# Patient Record
Sex: FEMALE | Race: WHITE | NOT HISPANIC OR LATINO | Employment: FULL TIME | URBAN - METROPOLITAN AREA
[De-identification: names, ages, dates, MRNs, and addresses within clinical notes are randomized per-mention and may not be internally consistent; named-entity substitution may affect disease eponyms.]

---

## 2017-01-19 ENCOUNTER — APPOINTMENT (OUTPATIENT)
Dept: LAB | Facility: CLINIC | Age: 22
End: 2017-01-19
Payer: COMMERCIAL

## 2017-01-19 ENCOUNTER — ALLSCRIPTS OFFICE VISIT (OUTPATIENT)
Dept: OTHER | Facility: OTHER | Age: 22
End: 2017-01-19

## 2017-01-19 DIAGNOSIS — Z00.00 ENCOUNTER FOR GENERAL ADULT MEDICAL EXAMINATION WITHOUT ABNORMAL FINDINGS: ICD-10-CM

## 2017-01-19 DIAGNOSIS — L81.8 OTHER SPECIFIED DISORDERS OF PIGMENTATION: ICD-10-CM

## 2017-01-19 LAB
ALBUMIN SERPL BCP-MCNC: 3.9 G/DL (ref 3.5–5)
ALP SERPL-CCNC: 40 U/L (ref 46–116)
ALT SERPL W P-5'-P-CCNC: 25 U/L (ref 12–78)
ANION GAP SERPL CALCULATED.3IONS-SCNC: 8 MMOL/L (ref 4–13)
AST SERPL W P-5'-P-CCNC: 8 U/L (ref 5–45)
BILIRUB SERPL-MCNC: 0.3 MG/DL (ref 0.2–1)
BUN SERPL-MCNC: 11 MG/DL (ref 5–25)
CALCIUM SERPL-MCNC: 8.6 MG/DL (ref 8.3–10.1)
CHLORIDE SERPL-SCNC: 106 MMOL/L (ref 100–108)
CO2 SERPL-SCNC: 28 MMOL/L (ref 21–32)
CREAT SERPL-MCNC: 0.62 MG/DL (ref 0.6–1.3)
GFR SERPL CREATININE-BSD FRML MDRD: >60 ML/MIN/1.73SQ M
GLUCOSE SERPL-MCNC: 78 MG/DL (ref 65–140)
POTASSIUM SERPL-SCNC: 3.7 MMOL/L (ref 3.5–5.3)
PROT SERPL-MCNC: 7.6 G/DL (ref 6.4–8.2)
SODIUM SERPL-SCNC: 142 MMOL/L (ref 136–145)

## 2017-01-19 PROCEDURE — 86705 HEP B CORE ANTIBODY IGM: CPT

## 2017-01-19 PROCEDURE — 80053 COMPREHEN METABOLIC PANEL: CPT

## 2017-01-19 PROCEDURE — 36415 COLL VENOUS BLD VENIPUNCTURE: CPT

## 2017-01-19 PROCEDURE — 87340 HEPATITIS B SURFACE AG IA: CPT

## 2017-01-19 PROCEDURE — 86803 HEPATITIS C AB TEST: CPT

## 2017-01-19 PROCEDURE — 86704 HEP B CORE ANTIBODY TOTAL: CPT

## 2017-01-20 LAB
HBV CORE AB SER QL: NORMAL
HBV CORE IGM SER QL: NORMAL
HBV SURFACE AG SER QL: NORMAL
HCV AB SER QL: NORMAL

## 2017-07-07 ENCOUNTER — HOSPITAL ENCOUNTER (EMERGENCY)
Facility: HOSPITAL | Age: 22
Discharge: HOME | End: 2017-07-07
Attending: EMERGENCY MEDICINE | Admitting: EMERGENCY MEDICINE
Payer: COMMERCIAL

## 2017-07-07 VITALS
SYSTOLIC BLOOD PRESSURE: 135 MMHG | HEART RATE: 90 BPM | RESPIRATION RATE: 16 BRPM | OXYGEN SATURATION: 99 % | WEIGHT: 147 LBS | TEMPERATURE: 98.2 F | DIASTOLIC BLOOD PRESSURE: 69 MMHG

## 2017-07-07 DIAGNOSIS — H61.22 IMPACTED CERUMEN, LEFT EAR: Primary | ICD-10-CM

## 2017-07-07 PROCEDURE — 99283 EMERGENCY DEPT VISIT LOW MDM: CPT

## 2017-07-16 ENCOUNTER — HOSPITAL ENCOUNTER (EMERGENCY)
Facility: HOSPITAL | Age: 22
Discharge: HOME | End: 2017-07-16
Attending: EMERGENCY MEDICINE | Admitting: EMERGENCY MEDICINE
Payer: COMMERCIAL

## 2017-07-16 VITALS
WEIGHT: 147 LBS | TEMPERATURE: 97.2 F | OXYGEN SATURATION: 98 % | HEART RATE: 72 BPM | HEIGHT: 61 IN | BODY MASS INDEX: 27.75 KG/M2 | DIASTOLIC BLOOD PRESSURE: 89 MMHG | SYSTOLIC BLOOD PRESSURE: 126 MMHG | RESPIRATION RATE: 20 BRPM

## 2017-07-16 DIAGNOSIS — Z04.1 ENCOUNTER FOR EXAMINATION FOLLOWING MOTOR VEHICLE COLLISION (MVC): ICD-10-CM

## 2017-07-16 DIAGNOSIS — M54.9 UPPER BACK PAIN: Primary | ICD-10-CM

## 2017-07-16 DIAGNOSIS — R51.9 HEADACHE: ICD-10-CM

## 2017-07-16 PROCEDURE — 99284 EMERGENCY DEPT VISIT MOD MDM: CPT

## 2017-07-16 RX ORDER — NAPROXEN 500 MG/1
500 TABLET ORAL ONCE
Status: COMPLETED | OUTPATIENT
Start: 2017-07-16 | End: 2017-07-16

## 2017-07-16 RX ORDER — ACETAMINOPHEN 325 MG/1
975 TABLET ORAL ONCE
Status: COMPLETED | OUTPATIENT
Start: 2017-07-16 | End: 2017-07-16

## 2017-07-16 RX ADMIN — ACETAMINOPHEN 975 MG: 325 TABLET, FILM COATED ORAL at 21:51

## 2017-07-16 RX ADMIN — NAPROXEN 500 MG: 500 TABLET ORAL at 21:52

## 2017-08-08 ENCOUNTER — APPOINTMENT (EMERGENCY)
Dept: CT IMAGING | Facility: HOSPITAL | Age: 22
End: 2017-08-08
Payer: COMMERCIAL

## 2017-08-08 ENCOUNTER — HOSPITAL ENCOUNTER (EMERGENCY)
Facility: HOSPITAL | Age: 22
Discharge: HOME | End: 2017-08-08
Attending: EMERGENCY MEDICINE
Payer: COMMERCIAL

## 2017-08-08 VITALS
OXYGEN SATURATION: 99 % | DIASTOLIC BLOOD PRESSURE: 76 MMHG | SYSTOLIC BLOOD PRESSURE: 134 MMHG | RESPIRATION RATE: 16 BRPM | HEART RATE: 80 BPM | BODY MASS INDEX: 27.78 KG/M2 | WEIGHT: 147 LBS | TEMPERATURE: 98.4 F

## 2017-08-08 DIAGNOSIS — R51.9 HEADACHE: Primary | ICD-10-CM

## 2017-08-08 DIAGNOSIS — S09.90XA HEAD INJURY, INITIAL ENCOUNTER: ICD-10-CM

## 2017-08-08 DIAGNOSIS — S06.0X9A CONCUSSION: ICD-10-CM

## 2017-08-08 LAB — HCG UR QL: NEGATIVE

## 2017-08-08 PROCEDURE — 70450 CT HEAD/BRAIN W/O DYE: CPT

## 2017-08-08 PROCEDURE — 81025 URINE PREGNANCY TEST: CPT | Performed by: EMERGENCY MEDICINE

## 2017-08-08 PROCEDURE — 99284 EMERGENCY DEPT VISIT MOD MDM: CPT

## 2017-08-08 RX ORDER — ONDANSETRON 4 MG/1
4 TABLET, ORALLY DISINTEGRATING ORAL ONCE
Status: COMPLETED | OUTPATIENT
Start: 2017-08-08 | End: 2017-08-08

## 2017-08-08 RX ORDER — ONDANSETRON 4 MG/1
4 TABLET, ORALLY DISINTEGRATING ORAL EVERY 8 HOURS PRN
Qty: 20 TABLET | Refills: 0 | Status: SHIPPED | OUTPATIENT
Start: 2017-08-08 | End: 2018-04-11

## 2017-08-08 RX ADMIN — ONDANSETRON 4 MG: 4 TABLET, ORALLY DISINTEGRATING ORAL at 01:22

## 2018-01-03 ENCOUNTER — ALLSCRIPTS OFFICE VISIT (OUTPATIENT)
Dept: OTHER | Facility: OTHER | Age: 23
End: 2018-01-03

## 2018-01-03 DIAGNOSIS — R93.89 ABNORMAL FINDINGS ON DIAGNOSTIC IMAGING OF OTHER SPECIFIED BODY STRUCTURES: ICD-10-CM

## 2018-01-03 DIAGNOSIS — L98.9 DISORDER OF SKIN OR SUBCUTANEOUS TISSUE: ICD-10-CM

## 2018-01-03 DIAGNOSIS — Z01.812 ENCOUNTER FOR PREPROCEDURAL LABORATORY EXAMINATION: ICD-10-CM

## 2018-01-04 ENCOUNTER — HOSPITAL ENCOUNTER (OUTPATIENT)
Dept: ULTRASOUND IMAGING | Facility: HOSPITAL | Age: 23
Discharge: HOME | End: 2018-01-04
Payer: COMMERCIAL

## 2018-01-04 DIAGNOSIS — L98.9 DISORDER OF SKIN OR SUBCUTANEOUS TISSUE: ICD-10-CM

## 2018-01-04 PROCEDURE — 76882 US LMTD JT/FCL EVL NVASC XTR: CPT

## 2018-01-04 NOTE — PROGRESS NOTES
Assessment   1  Thumb lesion (709 9) (L98 9)   2  Bilateral impacted cerumen (380 4) (O54 23)    Plan   Thumb lesion    · US EXTREMITY SOFT TISSUE; Status:Hold For - Scheduling; Requested [id number]; Discussion/Summary   Discussion Summary:    Right some swelling/lesion advised to get ultrasound of the right thumb, and to return to clinic in 1 week  Depending upon findings she may be referred to the surgical team if excision is warranted  cerumen bilateral ears advised to use Debrox 3 drops t i d  and return to clinic in 1 week for irrigation  is bisexual and has not had sex with a male partner in 2 years  However she is advised to make an appointment with gyn for regular female check up  Has 1 daughter age 3years old  Counseling Documentation With Imm: The patient was counseled regarding instructions for management,-- risk factor reductions,-- risks and benefits of treatment options,-- importance of compliance with treatment  Patient Education: Educational resources provided:    Medication SE Review and Pt Understands Tx: Possible side effects of new medications were reviewed with the patient/guardian today  The treatment plan was reviewed with the patient/guardian  The patient/guardian understands and agrees with the treatment plan      Chief Complaint   Chief Complaint Free Text Note Form: Patient presents to the clinic with swelling on the right thumb      History of Present Illness   HPI: As above reports she went bowling almost a year ago and felt like the bowling ball pinched a nerve and she had a swelling on the right thumb which never went away  She denies pain  The swelling has not been increasing in size but is uncomfortable  She denies any significant decrease in  strength  She was seen in January 2017 with the diagnosis of impacted cerumen  Advised to use Debrox and return for irrigation but she did not  Today her ears are still occluded with impacted cerumen   She is advised to use the Debrox and return to clinic in 1 week for irrigation  She reports she has had intermittent problems with hearing since her last visit  Hospital Based Practices Required Assessment:      Pain Assessment      the patient states they do not have pain  (on a scale of 0 to 10, the patient rates the pain at 0 )       Prefered Language is  [place]  Primary Language is  English  Education Completed: disease/condition,-- medications-- and-- treatment/procedure      Teaching Method: verbal      Person Taught: patient      Evaluation Of Learning: verbalized/demonstrated understanding      Review of Systems   Complete-Female:      Constitutional: No fever, no chills, feels well, no tiredness, no recent weight gain or weight loss  Eyes: as noted in HPI,-- no eye pain,-- no eyesight problems,-- eyes not red-- and-- no purulent discharge from the eyes  ENT: hearing loss, but-- as noted in HPI,-- no earache,-- no nosebleeds-- and-- no nasal discharge  Respiratory: No complaints of shortness of breath, no wheezing, no cough, no SOB on exertion, no orthopnea, no PND  Gastrointestinal: No complaints of abdominal pain, no constipation, no nausea or vomiting, no diarrhea, no bloody stools  Integumentary: skin lesion-- and-- Swelling lump on the right thumb, but-- as noted in HPI,-- no rashes,-- no itching-- and-- no skin wound  ROS Reviewed:    ROS reviewed  Active Problems   1  Bilateral impacted cerumen (380 4) (H61 23)   2  Need for immunization against influenza (V04 81) (Z23)   3  Need for Tdap vaccination (V06 1) (Z23)   4  Tattoos (709 09) (L81 8)    Past Medical History   1  History of  (957 90)   2  History of Bacterial vaginosis (616 10,041 9) (N76 0,B96 89)   3  History of Birth control (V25 9) (Z30 9)   4  History of abnormal menstrual cycle (V13 29) (Z87 42)   5  History of bipolar disorder (V11 1) (Z86 59)   6   History of Screening for STD (sexually transmitted disease) (V74 5) (Z11 3)   7  History of Vaginal candidiasis (112 1) (B37 3)  Active Problems And Past Medical History Reviewed: The active problems and past medical history were reviewed and updated today  Surgical History   1  History of  Section  Surgical History Reviewed: The surgical history was reviewed and updated today  Family History   Mother    1  Family history of hypertension (V17 49) (Z82 49)  Father    2  Family history unknown (V49 89) (Z78 9)  Family History    3  Denied: Family history of Drug abuse   4  No family history of mental disorder  Family History Reviewed: The family history was reviewed and updated today  Social History    · Being A Social Drinker   · Bisexual contact   · Current some day smoker (305 1) (F17 200)   · Denied: History of Drug Use   · Exercises, 4x week   · Occasional caffeine consumption   · One child  Social History Reviewed: The social history was reviewed and updated today  The social history was reviewed and is unchanged  Current Meds    1  Debrox 6 5 % Otic Solution; INSTILL 4 DROP 3 times daily; Therapy: [id number] to (Last Rx:2017)  Requested for: [id number] Ordered  Medication List Reviewed: The medication list was reviewed and updated today  Allergies   1  No Known Drug Allergies  2  No Known Environmental Allergies   3  No Known Food Allergies    Vitals   Vital Signs    Recorded: 71SGW8380 02:39PM   Temperature 97 9 F   Heart Rate 70   Systolic 98   Diastolic 62   Height 5 ft    Weight 142 lb 13 71 oz   BMI Calculated 27 9   BSA Calculated 1 62     Physical Exam        Constitutional      General appearance: No acute distress, well appearing and well nourished  well developed,-- normal body odor,-- does not smell of feces,-- does not smell of urine,-- well nourished,-- clothing appropriate-- and-- well groomed        Ears, Nose, Mouth, and Throat      External inspection of ears and nose: Normal  Otoscopic examination: Abnormal  -- Impacted cerumen bilateral ears, right worse than left  Oropharynx: Normal with no erythema, edema, exudate or lesions  Pulmonary      Respiratory effort: No increased work of breathing or signs of respiratory distress  Auscultation of lungs: Clear to auscultation  Cardiovascular      Auscultation of heart: Normal rate and rhythm, normal S1 and S2, without murmurs  Musculoskeletal      Digits and nails: Abnormal  -- There is a mass on the 1st metacarpal bone of the thumb  Slightly hard, movable  No skin abnormality in this area  Nontender to palpation  No decrease in strength of the thumb  Inspection/palpation of joints, bones, and muscles: Normal        Psychiatric      Orientation to person, place, and time: Normal        Mood and affect: Normal           Attending Note   Collaborating Physician Note: Collaborating Physician: I discussed the case with the Advanced Practitioner and reviewed the note,-- I supervised the Advanced Practitioner-- and-- I agree with the Advanced Practitioner note        Future Appointments      Date/Time Provider Specialty Site   01/10/2018 03:20 PM Mago Dubon 6 PCP     Signatures    Electronically signed by : Celeste Moralez [address] [place] [address]; Doug  3 2018  3:17PM EST                       (Author)     Electronically signed by : LAMBERT Ryan ; Doug  3 2018  3:41PM EST                       (Author)

## 2018-01-06 ENCOUNTER — TRANSCRIBE ORDERS (OUTPATIENT)
Dept: ADMINISTRATIVE | Facility: HOSPITAL | Age: 23
End: 2018-01-06

## 2018-01-06 DIAGNOSIS — L98.9 FIBROHISTIOCYTIC PROLIFERATION OF THE SKIN: ICD-10-CM

## 2018-01-06 DIAGNOSIS — R93.89 ABNORMAL RADIOLOGICAL FINDINGS IN SKIN AND SUBCUTANEOUS TISSUE: Primary | ICD-10-CM

## 2018-01-11 ENCOUNTER — GENERIC CONVERSION - ENCOUNTER (OUTPATIENT)
Dept: OTHER | Facility: OTHER | Age: 23
End: 2018-01-11

## 2018-01-11 ENCOUNTER — HOSPITAL ENCOUNTER (OUTPATIENT)
Dept: MRI IMAGING | Facility: HOSPITAL | Age: 23
Discharge: HOME | End: 2018-01-11
Payer: COMMERCIAL

## 2018-01-11 DIAGNOSIS — L98.9 FIBROHISTIOCYTIC PROLIFERATION OF THE SKIN: ICD-10-CM

## 2018-01-11 DIAGNOSIS — R93.89 ABNORMAL RADIOLOGICAL FINDINGS IN SKIN AND SUBCUTANEOUS TISSUE: ICD-10-CM

## 2018-01-11 PROCEDURE — 73220 MRI UPPR EXTREMITY W/O&W/DYE: CPT

## 2018-01-11 PROCEDURE — A9585 GADOBUTROL INJECTION: HCPCS | Performed by: NURSE PRACTITIONER

## 2018-01-11 RX ADMIN — GADOBUTROL 6 ML: 604.72 INJECTION INTRAVENOUS at 23:08

## 2018-01-12 VITALS
HEIGHT: 60 IN | WEIGHT: 147.71 LBS | BODY MASS INDEX: 29 KG/M2 | HEART RATE: 76 BPM | DIASTOLIC BLOOD PRESSURE: 68 MMHG | TEMPERATURE: 98.7 F | SYSTOLIC BLOOD PRESSURE: 114 MMHG

## 2018-01-15 ENCOUNTER — GENERIC CONVERSION - ENCOUNTER (OUTPATIENT)
Dept: OTHER | Facility: OTHER | Age: 23
End: 2018-01-15

## 2018-01-22 VITALS
BODY MASS INDEX: 28.05 KG/M2 | DIASTOLIC BLOOD PRESSURE: 62 MMHG | SYSTOLIC BLOOD PRESSURE: 98 MMHG | WEIGHT: 142.86 LBS | TEMPERATURE: 97.9 F | HEART RATE: 70 BPM | HEIGHT: 60 IN

## 2018-01-24 VITALS
WEIGHT: 143.3 LBS | HEIGHT: 60 IN | SYSTOLIC BLOOD PRESSURE: 98 MMHG | BODY MASS INDEX: 28.13 KG/M2 | TEMPERATURE: 98.7 F | HEART RATE: 72 BPM | DIASTOLIC BLOOD PRESSURE: 64 MMHG

## 2018-03-16 ENCOUNTER — OFFICE VISIT (OUTPATIENT)
Dept: OBGYN CLINIC | Facility: HOSPITAL | Age: 23
End: 2018-03-16
Payer: COMMERCIAL

## 2018-03-16 VITALS
DIASTOLIC BLOOD PRESSURE: 73 MMHG | WEIGHT: 134.2 LBS | BODY MASS INDEX: 26.35 KG/M2 | SYSTOLIC BLOOD PRESSURE: 112 MMHG | HEIGHT: 60 IN | HEART RATE: 83 BPM

## 2018-03-16 DIAGNOSIS — R22.31 MASS OF FINGER OF RIGHT HAND: Primary | ICD-10-CM

## 2018-03-16 PROCEDURE — 99203 OFFICE O/P NEW LOW 30 MIN: CPT | Performed by: ORTHOPAEDIC SURGERY

## 2018-03-16 NOTE — H&P
ASSESSMENT/PLAN:    Diagnoses and all orders for this visit:    Mass of finger of right hand  -     Case request operating room: EXCISION BIOPSY TISSUE LESION/MASS UPPER EXTREMITY right thumb; Standing  -     Case request operating room: EXCISION BIOPSY TISSUE LESION/MASS UPPER EXTREMITY right thumb    Other orders  -     Diet NPO; Sips with meds; Standing  -     Height and weight upon arrival; Standing  -     Void on call to OR; Standing  -     Insert peripheral IV; Standing  -     ceFAZolin (ANCEF) IVPB (premix) 2,000 mg; Infuse 2,000 mg into a venous catheter once         Assessment:   Mass  right  Thumb     Plan: Mass excision  right Ulnar  Middle phalanx of R thumb    Follow Up: After Surgery    To Do Next Visit:       General Discussions:       Operative Discussions:     Standard Consent: The risks and benefits of the procedure were explained to the patient, which include, but are not limited to: Bleeding, infection, recurrence, pain, scar, damage to tendons, damage to nerves, and damage to blood vessels, failure to give desired results and complications related to anesthesia  These risks, along with alternative conservative treatment options, and postoperative protocols were voiced back and understood by the patient  All questions were answered to the patient's satisfaction  The patient agrees to comply with a standard postoperative protocol, and is willing to proceed  Education was provided via written and auditory forms  There were no barriers to learning  Written handouts regarding wound care, incision and scar care, and general preoperative information was provided to the patient  Prior to surgery, the patient may be requested to stop all anti-inflammatory medications  Prophylactic aspirin, Plavix, and Coumadin may be allowed to be continued  Medications including vitamin E , ginkgo, and fish oil are requested to be stopped approximately one week prior to surgery    Hypertensive medications and beta blockers, if taken, should be continued  _____________________________________________________  CHIEF COMPLAINT:  Chief Complaint   Patient presents with    Right Thumb - Pain, Numbness         SUBJECTIVE:  Suzi Ortega is a 20y o  year old female who presents with discomfort and a mass to the right thumb  This started  1 year(s) ago as Sudden  Pt states that she does not recall any injury however she was bowling before this appeared and after she threw the ball she felt a pinch in her thumb  Pt states that the mass is growing in size over time  Radiation: Yes to the  thumb and occasionally  Previous Treatments: Patient got a US and MRI order by her PCP without relief  Associated symptoms: No Complaints    PAST MEDICAL HISTORY:  No past medical history on file  PAST SURGICAL HISTORY:  Past Surgical History:   Procedure Laterality Date     SECTION         FAMILY HISTORY:  No family history on file  SOCIAL HISTORY:  Social History   Substance Use Topics    Smoking status: Never Smoker    Smokeless tobacco: Not on file    Alcohol use Yes      Comment: occasionally       MEDICATIONS:    Current Outpatient Prescriptions:     ondansetron (ZOFRAN-ODT) 4 mg disintegrating tablet, Take 1 tablet by mouth every 8 (eight) hours as needed for nausea or vomiting for up to 7 days, Disp: 20 tablet, Rfl: 0    ALLERGIES:  No Known Allergies    REVIEW OF SYSTEMS:  Pertinent items are noted in HPI      LABS:  HgA1c: No results found for: HGBA1C  BMP:   Lab Results   Component Value Date    GLUCOSE 78 2017    CALCIUM 8 6 2017     2017    K 3 7 2017    CO2 28 2017     2017    BUN 11 2017    CREATININE 0 62 2017         _____________________________________________________  PHYSICAL EXAMINATION:  General: well developed and well nourished, alert, oriented times 3 and appears comfortable  Psychiatric: Normal  HEENT: Trachea Midline, No torticollis  Cardiovascular: No discernable arrhythmia  Pulmonary: No wheezing or stridor  Skin: Mass to the proximal phalanx of R thumb  Neurovascular: Sensation Intact to the Median, Ulnar, Radial Nerve, Motor Intact to the Median, Ulnar, Radial Nerve and Pulses Intact    MUSCULOSKELETAL EXAMINATION:  RIGHT SIDE:  Finger:  [id number] mobile mass to the ulnar side at mid axial line of proximal phalanx of R thumb     _____________________________________________________  STUDIES REVIEWED:  I have personally reviewed pertinent films in PACS and my interpretation is MRI shows a 1 8 x 1 5, 0 8 cm deep subcutaneous lesion medial to the 1st proximal phalanx         PROCEDURES PERFORMED:  Procedures  No Procedures performed today

## 2018-03-16 NOTE — PROGRESS NOTES
ASSESSMENT/PLAN:    Diagnoses and all orders for this visit:    Mass of finger of right hand  -     Case request operating room: EXCISION BIOPSY TISSUE LESION/MASS UPPER EXTREMITY right thumb; Standing  -     Case request operating room: EXCISION BIOPSY TISSUE LESION/MASS UPPER EXTREMITY right thumb    Other orders  -     Diet NPO; Sips with meds; Standing  -     Height and weight upon arrival; Standing  -     Void on call to OR; Standing  -     Insert peripheral IV; Standing  -     ceFAZolin (ANCEF) IVPB (premix) 2,000 mg; Infuse 2,000 mg into a venous catheter once         Assessment:   Mass  right  Thumb     Plan: Mass excision  right Ulnar  Middle phalanx of R thumb    Follow Up: After Surgery    To Do Next Visit:       General Discussions:       Operative Discussions:     Standard Consent: The risks and benefits of the procedure were explained to the patient, which include, but are not limited to: Bleeding, infection, recurrence, pain, scar, damage to tendons, damage to nerves, and damage to blood vessels, failure to give desired results and complications related to anesthesia  These risks, along with alternative conservative treatment options, and postoperative protocols were voiced back and understood by the patient  All questions were answered to the patient's satisfaction  The patient agrees to comply with a standard postoperative protocol, and is willing to proceed  Education was provided via written and auditory forms  There were no barriers to learning  Written handouts regarding wound care, incision and scar care, and general preoperative information was provided to the patient  Prior to surgery, the patient may be requested to stop all anti-inflammatory medications  Prophylactic aspirin, Plavix, and Coumadin may be allowed to be continued  Medications including vitamin E , ginkgo, and fish oil are requested to be stopped approximately one week prior to surgery    Hypertensive medications and beta blockers, if taken, should be continued  _____________________________________________________  CHIEF COMPLAINT:  Chief Complaint   Patient presents with    Right Thumb - Pain, Numbness         SUBJECTIVE:  Deanna Sanders is a 20y o  year old female who presents with discomfort and a mass to the right thumb  This started  1 year(s) ago as Sudden  Pt states that she does not recall any injury however she was bowling before this appeared and after she threw the ball she felt a pinch in her thumb  Pt states that the mass is growing in size over time  Radiation: Yes to the  thumb and occasionally  Previous Treatments: Patient got a US and MRI order by her PCP without relief  Associated symptoms: No Complaints    PAST MEDICAL HISTORY:  No past medical history on file  PAST SURGICAL HISTORY:  Past Surgical History:   Procedure Laterality Date     SECTION         FAMILY HISTORY:  No family history on file  SOCIAL HISTORY:  Social History   Substance Use Topics    Smoking status: Never Smoker    Smokeless tobacco: Not on file    Alcohol use Yes      Comment: occasionally       MEDICATIONS:    Current Outpatient Prescriptions:     ondansetron (ZOFRAN-ODT) 4 mg disintegrating tablet, Take 1 tablet by mouth every 8 (eight) hours as needed for nausea or vomiting for up to 7 days, Disp: 20 tablet, Rfl: 0    ALLERGIES:  No Known Allergies    REVIEW OF SYSTEMS:  Pertinent items are noted in HPI      LABS:  HgA1c: No results found for: HGBA1C  BMP:   Lab Results   Component Value Date    GLUCOSE 78 2017    CALCIUM 8 6 2017     2017    K 3 7 2017    CO2 28 2017     2017    BUN 11 2017    CREATININE 0 62 2017         _____________________________________________________  PHYSICAL EXAMINATION:  General: well developed and well nourished, alert, oriented times 3 and appears comfortable  Psychiatric: Normal  HEENT: Trachea Midline, No torticollis  Cardiovascular: No discernable arrhythmia  Pulmonary: No wheezing or stridor  Skin: Mass to the proximal phalanx of R thumb  Neurovascular: Sensation Intact to the Median, Ulnar, Radial Nerve, Motor Intact to the Median, Ulnar, Radial Nerve and Pulses Intact    MUSCULOSKELETAL EXAMINATION:  RIGHT SIDE:  Finger:  [id number] mobile mass to the ulnar side at mid axial line of proximal phalanx of R thumb     _____________________________________________________  STUDIES REVIEWED:  I have personally reviewed pertinent films in PACS and my interpretation is MRI shows a 1 8 x 1 5, 0 8 cm deep subcutaneous lesion medial to the 1st proximal phalanx         PROCEDURES PERFORMED:  Procedures  No Procedures performed today

## 2018-03-16 NOTE — LETTER
March 16, 2018     Patient: Zehra Mary   YOB: 1995   Date of Visit: 3/16/2018       To Whom it May Concern:    Janice Salgado is under my professional care  She was seen in my office on 3/16/2018  She may return to work on 3/19/18  If you have any questions or concerns, please don't hesitate to call           Sincerely,          Kati Webber MD        CC: No Recipients

## 2018-04-11 ENCOUNTER — OFFICE VISIT (OUTPATIENT)
Dept: OBGYN CLINIC | Facility: HOSPITAL | Age: 23
End: 2018-04-11
Payer: COMMERCIAL

## 2018-04-11 VITALS
BODY MASS INDEX: 25.91 KG/M2 | HEIGHT: 60 IN | HEART RATE: 78 BPM | SYSTOLIC BLOOD PRESSURE: 117 MMHG | WEIGHT: 132 LBS | DIASTOLIC BLOOD PRESSURE: 66 MMHG

## 2018-04-11 DIAGNOSIS — Z30.013 ENCOUNTER FOR INITIAL PRESCRIPTION OF INJECTABLE CONTRACEPTIVE: ICD-10-CM

## 2018-04-11 DIAGNOSIS — Z01.419 ENCOUNTER FOR GYNECOLOGICAL EXAMINATION WITH PAPANICOLAOU SMEAR OF CERVIX: Primary | ICD-10-CM

## 2018-04-11 DIAGNOSIS — Z11.3 SCREEN FOR STD (SEXUALLY TRANSMITTED DISEASE): ICD-10-CM

## 2018-04-11 PROCEDURE — 99395 PREV VISIT EST AGE 18-39: CPT | Performed by: OBSTETRICS & GYNECOLOGY

## 2018-04-11 PROCEDURE — G0124 SCREEN C/V THIN LAYER BY MD: HCPCS | Performed by: PATHOLOGY

## 2018-04-11 PROCEDURE — 3725F SCREEN DEPRESSION PERFORMED: CPT | Performed by: OBSTETRICS & GYNECOLOGY

## 2018-04-11 PROCEDURE — 87591 N.GONORRHOEAE DNA AMP PROB: CPT | Performed by: OBSTETRICS & GYNECOLOGY

## 2018-04-11 PROCEDURE — G0145 SCR C/V CYTO,THINLAYER,RESCR: HCPCS | Performed by: PATHOLOGY

## 2018-04-11 PROCEDURE — 87491 CHLMYD TRACH DNA AMP PROBE: CPT | Performed by: OBSTETRICS & GYNECOLOGY

## 2018-04-11 RX ORDER — MEDROXYPROGESTERONE ACETATE 150 MG/ML
150 INJECTION, SUSPENSION INTRAMUSCULAR
Qty: 1 ML | Refills: 6 | Status: SHIPPED | OUTPATIENT
Start: 2018-04-11 | End: 2019-07-17 | Stop reason: SDUPTHER

## 2018-04-11 NOTE — PROGRESS NOTES
Assessment/Plan:    No problem-specific Assessment & Plan notes found for this encounter  Diagnoses and all orders for this visit:    Encounter for gynecological examination with Papanicolaou smear of cervix  -     Liquid-based pap, screening    Screen for STD (sexually transmitted disease)  -     Chlamydia/GC amplified DNA by PCR    Encounter for initial prescription of injectable contraceptive  -     medroxyPROGESTERone (DEPO-PROVERA) 150 mg/mL injection; Inject 1 mL (150 mg total) into the shoulder, thigh, or buttocks every 3 (three) months      RTC for Depo injection at earliest convenience  Subjective:      Patient ID: Shirleyann Landau is a 20 y o   female  Here for routine annual exam   She has no complaints today  She is currently sexually active with one female partner  She would like to be tested for STI's and be started on Depo for contraception and improvement with HMB  Patient reports that her menses are regular, occurring every month and lasting for 4 days  She uses 5-6 pads/tampons per day and passes large clots with cramping  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  She reports smoking THC 3x/d and states that she plans to quit prior to starting school  She reports occasional alcohol use  She has a relatively healthy diet and does exercise  The following portions of the patient's history were reviewed and updated as appropriate:   She  has a past medical history of Migraine  She   Patient Active Problem List    Diagnosis Date Noted    Screen for STD (sexually transmitted disease) 2018    Encounter for gynecological examination with Papanicolaou smear of cervix 2018    Encounter for initial prescription of injectable contraceptive 2018    Mass of finger of right hand 2018     She  has a past surgical history that includes  section  Her family history includes Diabetes in her family    She reports that she has never smoked  She has never used smokeless tobacco  She reports that she drinks alcohol  She reports that she uses drugs, including Marijuana  Current Outpatient Prescriptions   Medication Sig Dispense Refill    medroxyPROGESTERone (DEPO-PROVERA) 150 mg/mL injection Inject 1 mL (150 mg total) into the shoulder, thigh, or buttocks every 3 (three) months 1 mL 6     No current facility-administered medications for this visit  Current Outpatient Prescriptions on File Prior to Visit   Medication Sig    [DISCONTINUED] ondansetron (ZOFRAN-ODT) 4 mg disintegrating tablet Take 1 tablet by mouth every 8 (eight) hours as needed for nausea or vomiting for up to 7 days     No current facility-administered medications on file prior to visit  She has No Known Allergies       Review of Systems   Constitutional: Negative  HENT: Negative  Eyes: Negative  Respiratory: Negative  Cardiovascular: Negative  Gastrointestinal: Negative  Endocrine: Negative  Genitourinary:        As per HPI   Musculoskeletal: Negative  Allergic/Immunologic: Negative  Neurological: Negative  Hematological: Negative  Psychiatric/Behavioral: Negative  Objective:      /66 (BP Location: Right arm, Patient Position: Sitting, Cuff Size: Standard)   Pulse 78   Ht 5' (1 524 m)   Wt 59 9 kg (132 lb)   LMP 03/24/2018 (Exact Date)   BMI 25 78 kg/m²          Physical Exam   Constitutional: She is oriented to person, place, and time  She appears well-developed and well-nourished  No distress  HENT:   Head: Normocephalic and atraumatic  Neck: Normal range of motion  Neck supple  No thyromegaly present  Cardiovascular: Normal rate, regular rhythm and normal heart sounds  Pulmonary/Chest: Effort normal and breath sounds normal  No respiratory distress  She has no wheezes  She exhibits no tenderness     Breast exam-no palpable masses, discharge, erythema or changes in skin Abdominal: Soft  She exhibits no distension  There is no tenderness  Genitourinary:   Genitourinary Comments: External genitalia- normal, no lesions  Vagina without lesions or discharge  Cervix no lesions or discharge  Uterus normal size, consistency and nontender  Adnexa nontender without palpable mass     Musculoskeletal: Normal range of motion  Neurological: She is alert and oriented to person, place, and time  Skin: Skin is warm and dry  Psychiatric: She has a normal mood and affect   Her behavior is normal

## 2018-04-11 NOTE — PATIENT INSTRUCTIONS
Please follow up for Depo at your earliest convenience  Will will contact you with the results of your Pap smear and other testing

## 2018-04-12 ENCOUNTER — CLINICAL SUPPORT (OUTPATIENT)
Dept: OBGYN CLINIC | Facility: HOSPITAL | Age: 23
End: 2018-04-12
Payer: COMMERCIAL

## 2018-04-12 DIAGNOSIS — Z30.013 ENCOUNTER FOR INITIAL PRESCRIPTION OF INJECTABLE CONTRACEPTIVE: Primary | ICD-10-CM

## 2018-04-12 LAB
CHLAMYDIA DNA CVX QL NAA+PROBE: NORMAL
N GONORRHOEA DNA GENITAL QL NAA+PROBE: NORMAL
SL AMB POCT URINE HCG: NEGATIVE

## 2018-04-12 PROCEDURE — 96372 THER/PROPH/DIAG INJ SC/IM: CPT | Performed by: OBSTETRICS & GYNECOLOGY

## 2018-04-12 PROCEDURE — 81025 URINE PREGNANCY TEST: CPT | Performed by: OBSTETRICS & GYNECOLOGY

## 2018-04-12 RX ORDER — MEDROXYPROGESTERONE ACETATE 150 MG/ML
150 INJECTION, SUSPENSION INTRAMUSCULAR ONCE
Status: COMPLETED | OUTPATIENT
Start: 2018-04-12 | End: 2018-04-12

## 2018-04-12 RX ADMIN — MEDROXYPROGESTERONE ACETATE 150 MG: 150 INJECTION, SUSPENSION INTRAMUSCULAR at 11:38

## 2018-04-13 NOTE — PRE-PROCEDURE INSTRUCTIONS
Pre-Surgery Instructions:   Medication Instructions    medroxyPROGESTERone (DEPO-PROVERA) 150 mg/mL injection Instructed patient per Anesthesia Guidelines  Pre procedure instructions given, will review CHG wash instructions given from dr Santiago Heal office prior to use  Has wash

## 2018-04-16 LAB
LAB AP GYN PRIMARY INTERPRETATION: NORMAL
Lab: NORMAL
PATH INTERP SPEC-IMP: NORMAL

## 2018-04-18 ENCOUNTER — ANESTHESIA EVENT (OUTPATIENT)
Dept: PERIOP | Facility: HOSPITAL | Age: 23
End: 2018-04-18
Payer: COMMERCIAL

## 2018-04-18 RX ORDER — SODIUM CHLORIDE, SODIUM LACTATE, POTASSIUM CHLORIDE, CALCIUM CHLORIDE 600; 310; 30; 20 MG/100ML; MG/100ML; MG/100ML; MG/100ML
75 INJECTION, SOLUTION INTRAVENOUS CONTINUOUS
Status: DISCONTINUED | OUTPATIENT
Start: 2018-04-18 | End: 2018-04-18

## 2018-04-19 ENCOUNTER — ANESTHESIA (OUTPATIENT)
Dept: PERIOP | Facility: HOSPITAL | Age: 23
End: 2018-04-19
Payer: COMMERCIAL

## 2018-04-19 ENCOUNTER — TELEPHONE (OUTPATIENT)
Dept: OBGYN CLINIC | Facility: HOSPITAL | Age: 23
End: 2018-04-19

## 2018-04-19 ENCOUNTER — HOSPITAL ENCOUNTER (OUTPATIENT)
Facility: HOSPITAL | Age: 23
Setting detail: OUTPATIENT SURGERY
Discharge: HOME | End: 2018-04-19
Attending: ORTHOPAEDIC SURGERY | Admitting: ORTHOPAEDIC SURGERY
Payer: COMMERCIAL

## 2018-04-19 VITALS
RESPIRATION RATE: 16 BRPM | HEART RATE: 74 BPM | SYSTOLIC BLOOD PRESSURE: 115 MMHG | TEMPERATURE: 98.5 F | DIASTOLIC BLOOD PRESSURE: 82 MMHG | OXYGEN SATURATION: 100 % | HEIGHT: 60 IN

## 2018-04-19 DIAGNOSIS — R22.31 MASS OF FINGER OF RIGHT HAND: ICD-10-CM

## 2018-04-19 LAB — EXT PREGNANCY TEST URINE: NEGATIVE

## 2018-04-19 PROCEDURE — 26160 REMOVE TENDON SHEATH LESION: CPT | Performed by: ORTHOPAEDIC SURGERY

## 2018-04-19 PROCEDURE — 81025 URINE PREGNANCY TEST: CPT | Performed by: ORTHOPAEDIC SURGERY

## 2018-04-19 PROCEDURE — 88305 TISSUE EXAM BY PATHOLOGIST: CPT | Performed by: PATHOLOGY

## 2018-04-19 RX ORDER — MAGNESIUM HYDROXIDE 1200 MG/15ML
LIQUID ORAL AS NEEDED
Status: DISCONTINUED | OUTPATIENT
Start: 2018-04-19 | End: 2018-04-19 | Stop reason: HOSPADM

## 2018-04-19 RX ORDER — HYDROCODONE BITARTRATE AND ACETAMINOPHEN 5; 325 MG/1; MG/1
1 TABLET ORAL EVERY 6 HOURS PRN
Status: DISCONTINUED | OUTPATIENT
Start: 2018-04-19 | End: 2018-04-19 | Stop reason: HOSPADM

## 2018-04-19 RX ORDER — SODIUM CHLORIDE, SODIUM LACTATE, POTASSIUM CHLORIDE, CALCIUM CHLORIDE 600; 310; 30; 20 MG/100ML; MG/100ML; MG/100ML; MG/100ML
75 INJECTION, SOLUTION INTRAVENOUS CONTINUOUS
Status: DISCONTINUED | OUTPATIENT
Start: 2018-04-19 | End: 2018-04-19 | Stop reason: HOSPADM

## 2018-04-19 RX ORDER — FENTANYL CITRATE 50 UG/ML
INJECTION, SOLUTION INTRAMUSCULAR; INTRAVENOUS AS NEEDED
Status: DISCONTINUED | OUTPATIENT
Start: 2018-04-19 | End: 2018-04-19 | Stop reason: SURG

## 2018-04-19 RX ORDER — LIDOCAINE HYDROCHLORIDE 10 MG/ML
INJECTION, SOLUTION INFILTRATION; PERINEURAL AS NEEDED
Status: DISCONTINUED | OUTPATIENT
Start: 2018-04-19 | End: 2018-04-19 | Stop reason: SURG

## 2018-04-19 RX ORDER — HYDROCODONE BITARTRATE AND ACETAMINOPHEN 5; 325 MG/1; MG/1
1 TABLET ORAL EVERY 6 HOURS PRN
Qty: 10 TABLET | Refills: 0 | Status: SHIPPED | OUTPATIENT
Start: 2018-04-19 | End: 2018-04-29

## 2018-04-19 RX ORDER — ONDANSETRON 2 MG/ML
INJECTION INTRAMUSCULAR; INTRAVENOUS AS NEEDED
Status: DISCONTINUED | OUTPATIENT
Start: 2018-04-19 | End: 2018-04-19 | Stop reason: SURG

## 2018-04-19 RX ORDER — MIDAZOLAM HYDROCHLORIDE 1 MG/ML
INJECTION INTRAMUSCULAR; INTRAVENOUS AS NEEDED
Status: DISCONTINUED | OUTPATIENT
Start: 2018-04-19 | End: 2018-04-19 | Stop reason: SURG

## 2018-04-19 RX ORDER — KETOROLAC TROMETHAMINE 30 MG/ML
INJECTION, SOLUTION INTRAMUSCULAR; INTRAVENOUS AS NEEDED
Status: DISCONTINUED | OUTPATIENT
Start: 2018-04-19 | End: 2018-04-19 | Stop reason: SURG

## 2018-04-19 RX ORDER — FENTANYL CITRATE/PF 50 MCG/ML
25 SYRINGE (ML) INJECTION
Status: DISCONTINUED | OUTPATIENT
Start: 2018-04-19 | End: 2018-04-19 | Stop reason: HOSPADM

## 2018-04-19 RX ORDER — LIDOCAINE HYDROCHLORIDE AND EPINEPHRINE 10; 10 MG/ML; UG/ML
INJECTION, SOLUTION INFILTRATION; PERINEURAL AS NEEDED
Status: DISCONTINUED | OUTPATIENT
Start: 2018-04-19 | End: 2018-04-19 | Stop reason: HOSPADM

## 2018-04-19 RX ORDER — PROPOFOL 10 MG/ML
INJECTION, EMULSION INTRAVENOUS AS NEEDED
Status: DISCONTINUED | OUTPATIENT
Start: 2018-04-19 | End: 2018-04-19 | Stop reason: SURG

## 2018-04-19 RX ADMIN — FENTANYL CITRATE 50 MCG: 50 INJECTION, SOLUTION INTRAMUSCULAR; INTRAVENOUS at 08:46

## 2018-04-19 RX ADMIN — ONDANSETRON 4 MG: 2 INJECTION INTRAMUSCULAR; INTRAVENOUS at 09:10

## 2018-04-19 RX ADMIN — PROPOFOL 180 MG: 10 INJECTION, EMULSION INTRAVENOUS at 08:47

## 2018-04-19 RX ADMIN — LIDOCAINE HYDROCHLORIDE 20 MG: 10 INJECTION, SOLUTION INFILTRATION; PERINEURAL at 08:47

## 2018-04-19 RX ADMIN — SODIUM CHLORIDE, SODIUM LACTATE, POTASSIUM CHLORIDE, AND CALCIUM CHLORIDE 75 ML/HR: .6; .31; .03; .02 INJECTION, SOLUTION INTRAVENOUS at 07:39

## 2018-04-19 RX ADMIN — CEFAZOLIN SODIUM 2000 MG: 2 SOLUTION INTRAVENOUS at 08:44

## 2018-04-19 RX ADMIN — MIDAZOLAM HYDROCHLORIDE 2 MG: 1 INJECTION, SOLUTION INTRAMUSCULAR; INTRAVENOUS at 08:44

## 2018-04-19 RX ADMIN — KETOROLAC TROMETHAMINE 30 MG: 30 INJECTION, SOLUTION INTRAMUSCULAR at 09:10

## 2018-04-19 NOTE — H&P
H&P Exam - Orthopedics   Juan Shepard 20 y o  female MRN: [id number]  Unit/Bed#: HOLDING 1    Assessment/Plan   Assessment:  Right thumb mass  Plan:  Right thumb mass excision    History of Present Illness   HPI:  Juan Shepard is a 20 y o  y o  female who presents with right thumb mass failed conservative management      Historical Information  Review Of Systems:   · Skin: Normal  · Neuro: See HPI  · Musculoskeletal: See HPI  · 14 point review of systems negative except as stated above     Past Medical History:   Past Medical History:   Diagnosis Date    Migraine        Past Surgical History:   Past Surgical History:   Procedure Laterality Date     SECTION         Family History:  Family history reviewed and non-contributory  Family History   Problem Relation Age of Onset    Diabetes Family     No Known Problems Mother     No Known Problems Father        Social History:  Social History     Social History    Marital status: Single     Spouse name: N/A    Number of children: N/A    Years of education: N/A     Social History Main Topics    Smoking status: Former Smoker     Packs/day: 0 25     Quit date: 10/13/2017    Smokeless tobacco: Never Used      Comment: pt was a social smoker, quit 6 months ago    Alcohol use Yes      Comment: occasionally    Drug use: Yes     Types: Marijuana      Comment: still current user,  instructed to abstain     Sexual activity: Not Asked     Other Topics Concern    None     Social History Narrative    None       Allergies:   No Known Allergies        Labs:    0  Lab Value Date/Time   HCT 40 7 2015 1251   HGB 13 8 2015 1251   WBC 17 94 (H) 2015 1251       Meds:    Current Facility-Administered Medications:     ceFAZolin (ANCEF) IVPB (premix) 2,000 mg, 2,000 mg, Intravenous, Once, Nate Oliveira MD    lactated ringers infusion, 75 mL/hr, Intravenous, Continuous, Avelino Ashley MD, Last Rate: 75 mL/hr at 18 0739, 75 mL/hr at 04/19/18 0739    Blood Culture:   No results found for: BLOODCX    Wound Culture:   No results found for: WOUNDCULT    Ins and Outs:  No intake/output data recorded  Physical Exam  /72   Pulse 77   Temp 98 4 °F (36 9 °C) (Oral)   Resp 20   Ht 5' (1 524 m)   LMP 03/24/2018 (Exact Date)   SpO2 99%   Gen: Alert and oriented to person, place, time  HEENT: EOMI, eyes clear, moist mucus membranes, hearing intact  Respiratory: Bilateral chest rise  No audible wheezing found  Cardiovascular: Regular Rate and Rhythm  Abdomen: soft nontender/nondistended  Ortho Exam:  Mass over the right thumb that has been slowly enlarging  Neuro Exam:  The patient is neurovascularly intact in the median, ulnar, and radial nerve distribution  There is normal sensation and good capillary refill within the digits  2+ pulses      Lab Results: Reviewed  Imaging: Reviewed

## 2018-04-19 NOTE — ANESTHESIA PREPROCEDURE EVALUATION
Review of Systems/Medical History  Patient summary reviewed  Chart reviewed      Cardiovascular   Pulmonary  Smoker ,   Comment: Marijuana daily     GI/Hepatic            Endo/Other     GYN       Hematology   Musculoskeletal       Neurology   Psychology   Anxiety,              Physical Exam    Airway    Mallampati score: II  TM Distance: >3 FB  Neck ROM: full     Dental   No notable dental hx     Cardiovascular  Rhythm: regular, Rate: normal, Cardiovascular exam normal    Pulmonary  Pulmonary exam normal Breath sounds clear to auscultation,     Other Findings        Anesthesia Plan  ASA Score- 2     Anesthesia Type- general with ASA Monitors  Additional Monitors:   Airway Plan: LMA  Comment: Benefits and risks of planned anesthetic discussed with patient, and agrees to proceed  I, Dr Kaity Jurado, the attending anesthesiologist, have personally seen and evaluated the patient prior to anesthetic care  I have reviewed the preanesthetic record, and other medical records if appropriate to the anesthetic care  If a CRNA is involved in the case, I have reviewed the CRNA assessment, if present, and agree  The patient is in a suitable condition to proceed with my formulated anesthetic plan        Plan Factors- Patient instructed to abstain from smoking on day of procedure  Patient did not smoke on day of surgery  Induction- intravenous  Postoperative Plan- Plan for postoperative opioid use  Informed Consent- Anesthetic plan and risks discussed with patient

## 2018-04-19 NOTE — OP NOTE
OPERATIVE REPORT  PATIENT NAME: Lisa Palacios  :  1995  MRN: [id number]  Pt Location: [facility]    SURGERY DATE: 18    Surgeon(s) and Role:     * Rayne Moody MD - Primary     * Arlen Mercedes - Assisting    Pre-Op Diagnosis:  Mass of finger of right hand [R22 31]    Post-Op Diagnosis Codes:     * Mass of finger of right hand [R22 31]    Procedure(s):  EXCISION BIOPSY TISSUE LESION/MASS UPPER EXTREMITY  thumb - DEEP 2CM X 2 CM X 1 CM (Right)    Specimen(s):    Order Name Source Comment Collection Info Order Time   TISSUE EXAM Finger, Right  Collected By: Rayne Moody MD 2018  9:23 AM       Estimated Blood Loss:   10 mL      Anesthesia Type:   General    Operative Indications: The patient has a history of Mass  right  thumb that was recalcitrant to conservative management  The decision was made to bring the patient to the operating room for Mass excision  right Ulnar  thumb  Risks of the procedure were explained which include, but are not limited to bleeding; infection; damage to nerves, arteries,veins, tendons; scar; pain; need for reoperation; failure to give desired result; and risks of anaesthesia  All questions were answered to satisfaction and they were willing to proceed  Operative Findings:  GIANT CELL MASS OF THE RIGHT PROXIMAL PHALANX ULNAR ASPECT OF THE THUMB    Complications:   None    Procedure and Technique:  After the patient, site, and procedure were identified, the patient was brought into the operating room in a supine position  General anaesthesia and local medication were provided  A well padded tourniquet was applied to the extremity, set at 250 mmHg  The  right upper extremity was then prepped and drapped in a normal, sterile, orthopedic fashion  After the patient, site, and procedure were identified attention was turned towards the right hand  The right arm was then exsanguinated with an Esmarch bandage    A longitudinal incision along the ulnar mid axial line was then made  We dissected down through the skin and subcutaneous tissues identifying and protecting the ulnar digital artery and nerve  This was carefully retracted and a large giant cell mass coming off the flexor tendon sheath was then identified over the proximal phalanx  This was circumferentially dissected and then removed  This was well in capsulated  This measured 2 cm x 2 cm x 1 cm  At this point, we sent this for routine pathologic evaluation  The tourniquet was then deflated and meticulous hemostasis was obtained  At the completion of the procedure, hemostasis was obtained with cautery and direct pressure  The wounds were copiously irrigated with sterile solution  The wounds were closed with Prolene  Sterile dressings were applied, including Xeroform, gauze, tweeners, webril, ACE  Please note, all sponge, needle, and instrument counts were correct prior to closure  Loupe magnification was utilized  The patient tolerated the procedure well       I was present for the entire procedure    Patient Disposition:  PACU  and hemodynamically stable    SIGNATURE: Sadia Hernández MD  DATE: 04/19/18  TIME: 9:28 AM

## 2018-04-19 NOTE — TELEPHONE ENCOUNTER
Pt says [facility] [address] in [place] and she is wondering if it can be sent to the [facility] on [address] in [place] instead

## 2018-04-20 NOTE — TELEPHONE ENCOUNTER
Called pharmacy, patient already picked the rx up  Can we confirm? I don't believe this message was sent to us until after we had left for the day

## 2018-04-23 NOTE — TELEPHONE ENCOUNTER
Patient coming in for appointment today 4/23 at 2:30 pm will check with patient to see if she was able to  her rx

## 2018-05-02 ENCOUNTER — OFFICE VISIT (OUTPATIENT)
Dept: OBGYN CLINIC | Facility: HOSPITAL | Age: 23
End: 2018-05-02

## 2018-05-02 VITALS
BODY MASS INDEX: 26.27 KG/M2 | HEIGHT: 60 IN | DIASTOLIC BLOOD PRESSURE: 67 MMHG | SYSTOLIC BLOOD PRESSURE: 106 MMHG | WEIGHT: 133.8 LBS | HEART RATE: 88 BPM

## 2018-05-02 DIAGNOSIS — R22.31 MASS OF FINGER OF RIGHT HAND: Primary | ICD-10-CM

## 2018-05-02 PROCEDURE — 99024 POSTOP FOLLOW-UP VISIT: CPT | Performed by: ORTHOPAEDIC SURGERY

## 2018-05-02 NOTE — LETTER
May 2, 2018     Patient: Tapan Gay   YOB: 1995   Date of Visit: 5/2/2018       To Whom it May Concern:    Angel Rivas is under my professional care  She was seen in my office on 5/2/2018  She may return to work on  5/7/2018  If you have any questions or concerns, please don't hesitate to call           Sincerely,          Vicente Walker MD        CC: Tapan Gay

## 2018-05-22 ENCOUNTER — DOCUMENTATION (OUTPATIENT)
Dept: OBGYN CLINIC | Facility: HOSPITAL | Age: 23
End: 2018-05-22

## 2018-05-22 NOTE — PROGRESS NOTES
Called phone number on file, pt's mother, Amil Soulier answered  Office number provided for pt to call us back

## 2018-06-27 ENCOUNTER — CLINICAL SUPPORT (OUTPATIENT)
Dept: OBGYN CLINIC | Facility: CLINIC | Age: 23
End: 2018-06-27
Payer: COMMERCIAL

## 2018-06-27 DIAGNOSIS — Z30.42 ENCOUNTER FOR SURVEILLANCE OF INJECTABLE CONTRACEPTIVE: Primary | ICD-10-CM

## 2018-06-27 LAB — SL AMB POCT URINE HCG: NEGATIVE

## 2018-06-27 PROCEDURE — 96372 THER/PROPH/DIAG INJ SC/IM: CPT

## 2018-06-27 PROCEDURE — 81025 URINE PREGNANCY TEST: CPT

## 2018-06-27 RX ORDER — MEDROXYPROGESTERONE ACETATE 150 MG/ML
150 INJECTION, SUSPENSION INTRAMUSCULAR ONCE
Status: COMPLETED | OUTPATIENT
Start: 2018-06-27 | End: 2018-06-27

## 2018-06-27 RX ADMIN — MEDROXYPROGESTERONE ACETATE 150 MG: 150 INJECTION, SUSPENSION INTRAMUSCULAR at 16:24

## 2018-06-28 PROBLEM — R87.612 LGSIL ON PAP SMEAR OF CERVIX: Status: ACTIVE | Noted: 2018-06-28

## 2019-05-14 ENCOUNTER — TELEPHONE (OUTPATIENT)
Dept: OBGYN CLINIC | Facility: CLINIC | Age: 24
End: 2019-05-14

## 2019-06-07 ENCOUNTER — TELEPHONE (OUTPATIENT)
Dept: OBGYN CLINIC | Facility: CLINIC | Age: 24
End: 2019-06-07

## 2019-06-25 ENCOUNTER — HOSPITAL ENCOUNTER (EMERGENCY)
Facility: HOSPITAL | Age: 24
Discharge: HOME | End: 2019-06-25
Attending: EMERGENCY MEDICINE | Admitting: EMERGENCY MEDICINE
Payer: COMMERCIAL

## 2019-06-25 VITALS
SYSTOLIC BLOOD PRESSURE: 114 MMHG | TEMPERATURE: 97.5 F | DIASTOLIC BLOOD PRESSURE: 70 MMHG | HEART RATE: 77 BPM | WEIGHT: 150.79 LBS | RESPIRATION RATE: 20 BRPM | OXYGEN SATURATION: 100 % | BODY MASS INDEX: 29.95 KG/M2

## 2019-06-25 DIAGNOSIS — R11.10 VOMITING: ICD-10-CM

## 2019-06-25 DIAGNOSIS — E86.0 DEHYDRATION: ICD-10-CM

## 2019-06-25 DIAGNOSIS — T67.5XXA HEAT EXHAUSTION, INITIAL ENCOUNTER: ICD-10-CM

## 2019-06-25 DIAGNOSIS — R11.0 NAUSEA: ICD-10-CM

## 2019-06-25 DIAGNOSIS — R42 LIGHTHEADEDNESS: Primary | ICD-10-CM

## 2019-06-25 LAB
ANION GAP SERPL CALCULATED.3IONS-SCNC: 10 MMOL/L (ref 4–13)
ATRIAL RATE: 69 BPM
BACTERIA UR QL AUTO: ABNORMAL /HPF
BASOPHILS # BLD AUTO: 0.05 THOUSANDS/ΜL (ref 0–0.1)
BASOPHILS NFR BLD AUTO: 1 % (ref 0–1)
BILIRUB UR QL STRIP: NEGATIVE
BUN SERPL-MCNC: 11 MG/DL (ref 5–25)
CALCIUM SERPL-MCNC: 9.2 MG/DL (ref 8.3–10.1)
CHLORIDE SERPL-SCNC: 106 MMOL/L (ref 100–108)
CLARITY UR: CLEAR
CO2 SERPL-SCNC: 28 MMOL/L (ref 21–32)
COLOR UR: YELLOW
COLOR, POC: YELLOW
CREAT SERPL-MCNC: 0.61 MG/DL (ref 0.6–1.3)
EOSINOPHIL # BLD AUTO: 0.05 THOUSAND/ΜL (ref 0–0.61)
EOSINOPHIL NFR BLD AUTO: 1 % (ref 0–6)
ERYTHROCYTE [DISTWIDTH] IN BLOOD BY AUTOMATED COUNT: 12.3 % (ref 11.6–15.1)
EXT PREG TEST URINE: NEGATIVE
EXT. CONTROL ED NAV: NORMAL
GFR SERPL CREATININE-BSD FRML MDRD: 126 ML/MIN/1.73SQ M
GLUCOSE SERPL-MCNC: 87 MG/DL (ref 65–140)
GLUCOSE UR STRIP-MCNC: NEGATIVE MG/DL
HCT VFR BLD AUTO: 41.2 % (ref 34.8–46.1)
HGB BLD-MCNC: 13.5 G/DL (ref 11.5–15.4)
HGB UR QL STRIP.AUTO: NEGATIVE
IMM GRANULOCYTES # BLD AUTO: 0.03 THOUSAND/UL (ref 0–0.2)
IMM GRANULOCYTES NFR BLD AUTO: 0 % (ref 0–2)
KETONES UR STRIP-MCNC: ABNORMAL MG/DL
LEUKOCYTE ESTERASE UR QL STRIP: NEGATIVE
LYMPHOCYTES # BLD AUTO: 1.3 THOUSANDS/ΜL (ref 0.6–4.47)
LYMPHOCYTES NFR BLD AUTO: 15 % (ref 14–44)
MCH RBC QN AUTO: 29.6 PG (ref 26.8–34.3)
MCHC RBC AUTO-ENTMCNC: 32.8 G/DL (ref 31.4–37.4)
MCV RBC AUTO: 90 FL (ref 82–98)
MONOCYTES # BLD AUTO: 0.49 THOUSAND/ΜL (ref 0.17–1.22)
MONOCYTES NFR BLD AUTO: 6 % (ref 4–12)
NEUTROPHILS # BLD AUTO: 6.99 THOUSANDS/ΜL (ref 1.85–7.62)
NEUTS SEG NFR BLD AUTO: 77 % (ref 43–75)
NITRITE UR QL STRIP: NEGATIVE
NON-SQ EPI CELLS URNS QL MICRO: ABNORMAL /HPF
NRBC BLD AUTO-RTO: 0 /100 WBCS
P AXIS: 79 DEGREES
PH UR STRIP.AUTO: 6.5 [PH] (ref 4.5–8)
PLATELET # BLD AUTO: 247 THOUSANDS/UL (ref 149–390)
PMV BLD AUTO: 10.5 FL (ref 8.9–12.7)
POTASSIUM SERPL-SCNC: 3.6 MMOL/L (ref 3.5–5.3)
PR INTERVAL: 174 MS
PROT UR STRIP-MCNC: ABNORMAL MG/DL
QRS AXIS: 91 DEGREES
QRSD INTERVAL: 90 MS
QT INTERVAL: 380 MS
QTC INTERVAL: 407 MS
RBC # BLD AUTO: 4.56 MILLION/UL (ref 3.81–5.12)
RBC #/AREA URNS AUTO: ABNORMAL /HPF
SODIUM SERPL-SCNC: 144 MMOL/L (ref 136–145)
SP GR UR STRIP.AUTO: >=1.03 (ref 1–1.03)
T WAVE AXIS: 65 DEGREES
UROBILINOGEN UR QL STRIP.AUTO: 0.2 E.U./DL
VENTRICULAR RATE: 69 BPM
WBC # BLD AUTO: 8.91 THOUSAND/UL (ref 4.31–10.16)
WBC #/AREA URNS AUTO: ABNORMAL /HPF

## 2019-06-25 PROCEDURE — 80048 BASIC METABOLIC PNL TOTAL CA: CPT | Performed by: EMERGENCY MEDICINE

## 2019-06-25 PROCEDURE — 96361 HYDRATE IV INFUSION ADD-ON: CPT

## 2019-06-25 PROCEDURE — 99283 EMERGENCY DEPT VISIT LOW MDM: CPT | Performed by: EMERGENCY MEDICINE

## 2019-06-25 PROCEDURE — 81001 URINALYSIS AUTO W/SCOPE: CPT

## 2019-06-25 PROCEDURE — 85025 COMPLETE CBC W/AUTO DIFF WBC: CPT | Performed by: EMERGENCY MEDICINE

## 2019-06-25 PROCEDURE — 81025 URINE PREGNANCY TEST: CPT | Performed by: EMERGENCY MEDICINE

## 2019-06-25 PROCEDURE — 99284 EMERGENCY DEPT VISIT MOD MDM: CPT

## 2019-06-25 PROCEDURE — 36415 COLL VENOUS BLD VENIPUNCTURE: CPT | Performed by: EMERGENCY MEDICINE

## 2019-06-25 PROCEDURE — 96374 THER/PROPH/DIAG INJ IV PUSH: CPT

## 2019-06-25 PROCEDURE — 93010 ELECTROCARDIOGRAM REPORT: CPT | Performed by: INTERNAL MEDICINE

## 2019-06-25 PROCEDURE — 93005 ELECTROCARDIOGRAM TRACING: CPT

## 2019-06-25 RX ORDER — ONDANSETRON 2 MG/ML
4 INJECTION INTRAMUSCULAR; INTRAVENOUS ONCE
Status: COMPLETED | OUTPATIENT
Start: 2019-06-25 | End: 2019-06-25

## 2019-06-25 RX ORDER — ONDANSETRON 4 MG/1
4 TABLET, FILM COATED ORAL EVERY 8 HOURS PRN
Qty: 7 TABLET | Refills: 0 | Status: SHIPPED | OUTPATIENT
Start: 2019-06-25 | End: 2019-09-19

## 2019-06-25 RX ADMIN — SODIUM CHLORIDE 1000 ML: 0.9 INJECTION, SOLUTION INTRAVENOUS at 17:51

## 2019-06-25 RX ADMIN — ONDANSETRON 4 MG: 2 INJECTION INTRAMUSCULAR; INTRAVENOUS at 17:50

## 2019-06-27 ENCOUNTER — OFFICE VISIT (OUTPATIENT)
Dept: INTERNAL MEDICINE CLINIC | Facility: CLINIC | Age: 24
End: 2019-06-27

## 2019-06-27 VITALS
DIASTOLIC BLOOD PRESSURE: 62 MMHG | BODY MASS INDEX: 29 KG/M2 | HEIGHT: 60 IN | WEIGHT: 147.71 LBS | SYSTOLIC BLOOD PRESSURE: 80 MMHG | HEART RATE: 72 BPM | TEMPERATURE: 97.8 F

## 2019-06-27 DIAGNOSIS — I95.9 HYPOTENSION, UNSPECIFIED HYPOTENSION TYPE: ICD-10-CM

## 2019-06-27 DIAGNOSIS — R42 LIGHTHEADEDNESS: ICD-10-CM

## 2019-06-27 DIAGNOSIS — E86.0 DEHYDRATION: Primary | ICD-10-CM

## 2019-06-27 PROCEDURE — 99214 OFFICE O/P EST MOD 30 MIN: CPT | Performed by: PHYSICIAN ASSISTANT

## 2019-06-28 ENCOUNTER — TELEPHONE (OUTPATIENT)
Dept: OBGYN CLINIC | Facility: CLINIC | Age: 24
End: 2019-06-28

## 2019-07-11 NOTE — PROGRESS NOTES
Subjective:     Abdirahman Gilbert is a 21 y o   female who presents for colposcopy  She had an abnormal pap in 2018  Her most recent pap on 2018 showed LSIL cannot exclude high grade lesion  Her original appointment today was for an annual appointment but since she did not return to clinic for previous colposcopy  She was counseled on the need to perform the procedure today  She states that she feels well today  She has no questions about the procedure today  She states that she occasionally has bleeding with intercourse  She states that she would like to restart her Depo for contraception  Patient Active Problem List   Diagnosis    Mass of finger of right hand    LGSIL on Pap smear of cervix    Low grade squamous intraepithelial lesion (LGSIL) at risk for high grade squamous intraepithelial lesion (HGSIL) on cytologic smear of cervix     Past Medical History:   Diagnosis Date         Bipolar disorder ([facility] [address] )     Migraine        Objective:    Vitals: Height 5' (1 524 m), weight 65 8 kg (145 lb), last menstrual period 2019  Body mass index is 28 32 kg/m²      Physical Exam      Colposcopy  Date/Time: 2019 3:47 PM  Performed by: Colletta No, MD  Authorized by: Colletta No, MD     Consent:     Consent obtained:  Written    Consent given by:  Patient    Procedural risks discussed:  Bleeding, damage to other organs, failure rate, infection and repeat procedure    Patient questions answered: yes      Patient agrees, verbalizes understanding, and wants to proceed: yes      Educational handouts given: yes      Instructions and paperwork completed: yes    Pre-procedure:     Prepped with: acetic acid    Indication:     Indication:  LSIL (cannot exclude high-grade dysplasia)  Procedure:     Procedure comment:  Colposcopy with biopsy of cervix and endocervical curettage    Seiling speculum was placed in the vagina: yes      Under colposcopic examination the transition zone was seen in entirety: yes      Monsel's solution was applied: yes      Biopsy(s): yes      Location:  12 o'clock, 4 o'clock    Specimen to pathology: yes    Post-procedure:     Findings: Bleeding and White epithelium      Patient tolerance of procedure:   Tolerated well, no immediate complications        Assessment/Plan:    Problem List Items Addressed This Visit        Other    Low grade squamous intraepithelial lesion (LGSIL) at risk for high grade squamous intraepithelial lesion (HGSIL) on cytologic smear of cervix - Primary     Colposcopy performed today  Patient instructed to return for colposcopy results and annual appointment  Will restart Depo as requested when patient returns for her annual appointments  Instructions s/p colposcopy reviewed         Relevant Orders    Colposcopy (Completed)      Other Visit Diagnoses     Health care maintenance        Relevant Orders    POCT urine HCG (Completed)            Awilda Blanchard MD  7/15/2019  10:57 AM

## 2019-07-15 ENCOUNTER — PROCEDURE VISIT (OUTPATIENT)
Dept: OBGYN CLINIC | Facility: CLINIC | Age: 24
End: 2019-07-15

## 2019-07-15 VITALS — HEIGHT: 60 IN | BODY MASS INDEX: 28.47 KG/M2 | WEIGHT: 145 LBS

## 2019-07-15 DIAGNOSIS — R87.612 LOW GRADE SQUAMOUS INTRAEPITHELIAL LESION (LGSIL) AT RISK FOR HIGH GRADE SQUAMOUS INTRAEPITHELIAL LESION (HGSIL) ON CYTOLOGIC SMEAR OF CERVIX: Primary | ICD-10-CM

## 2019-07-15 DIAGNOSIS — Z00.00 HEALTH CARE MAINTENANCE: ICD-10-CM

## 2019-07-15 PROBLEM — Z30.013 ENCOUNTER FOR INITIAL PRESCRIPTION OF INJECTABLE CONTRACEPTIVE: Status: RESOLVED | Noted: 2018-04-11 | Resolved: 2019-07-15

## 2019-07-15 PROBLEM — Z11.3 SCREEN FOR STD (SEXUALLY TRANSMITTED DISEASE): Status: RESOLVED | Noted: 2018-04-11 | Resolved: 2019-07-15

## 2019-07-15 PROBLEM — Z01.419 ENCOUNTER FOR GYNECOLOGICAL EXAMINATION WITH PAPANICOLAOU SMEAR OF CERVIX: Status: RESOLVED | Noted: 2018-04-11 | Resolved: 2019-07-15

## 2019-07-15 LAB — SL AMB POCT URINE HCG: NEGATIVE

## 2019-07-15 PROCEDURE — 81025 URINE PREGNANCY TEST: CPT | Performed by: OBSTETRICS & GYNECOLOGY

## 2019-07-15 PROCEDURE — 88344 IMHCHEM/IMCYTCHM EA MLT ANTB: CPT | Performed by: PATHOLOGY

## 2019-07-15 PROCEDURE — 88305 TISSUE EXAM BY PATHOLOGIST: CPT | Performed by: PATHOLOGY

## 2019-07-15 PROCEDURE — G0145 SCR C/V CYTO,THINLAYER,RESCR: HCPCS | Performed by: PATHOLOGY

## 2019-07-15 PROCEDURE — 57454 BX/CURETT OF CERVIX W/SCOPE: CPT | Performed by: OBSTETRICS & GYNECOLOGY

## 2019-07-15 PROCEDURE — G0124 SCREEN C/V THIN LAYER BY MD: HCPCS | Performed by: PATHOLOGY

## 2019-07-15 NOTE — PATIENT INSTRUCTIONS
Colposcopy   WHAT YOU NEED TO KNOW:   A colposcopy is a procedure to look for abnormal cells in your cervix and vagina  Your healthcare provider will use a colposcope, which is a small scope with a light on it  DISCHARGE INSTRUCTIONS:   Medicines:   · Pain medicine: You may be given medicine to take away or decrease pain  Do not wait until the pain is severe before you take your medicine  · Take your medicine as directed  Call your healthcare provider if you think your medicine is not helping or if you have side effects  Tell him if you are allergic to any medicine  Keep a list of the medicines, vitamins, and herbs you take  Include the amounts, and when and why you take them  Bring the list or the pill bottles to follow-up visits  Carry your medicine list with you in case of an emergency  Follow up with your healthcare provider or gynecologist as directed: You may need to return for more tests or to have abnormal cells removed  Write down your questions so you remember to ask them during your visits  Self-care:  Use a sanitary pad for any light bleeding  Ask when it is okay to use tampons, douche, or have sex  If you are pregnant, do not put anything in your vagina until your healthcare provider says it is okay  Avoid heavy lifting for 24 hours after your procedure, this will decrease your risk of bleeding  Contact your healthcare provider or gynecologist if:   · You have pain that does not go away, even after you take pain medicine  · You have a fever  · You have questions or concerns about your condition or care  Seek care immediately or call 911 if:   · You have bleeding from your vagina that is heavier than your monthly period  © 2016 [address] [employer] [address] is for End User's use only and may not be sold, redistributed or otherwise used for commercial purposes   All illustrations and images included in CareNotes® are the copyrighted property of [employer] , [employer]  or [facility] [employer]  The above information is an  only  It is not intended as medical advice for individual conditions or treatments  Talk to your doctor, nurse or pharmacist before following any medical regimen to see if it is safe and effective for you

## 2019-07-15 NOTE — ASSESSMENT & PLAN NOTE
Colposcopy performed today  Patient instructed to return for colposcopy results and annual appointment  Will restart Depo as requested when patient returns for her annual appointments  Instructions s/p colposcopy reviewed

## 2019-07-17 NOTE — PROGRESS NOTES
Subjective      Blencoe Gregory Diaz is a 21 y o  female who presents for annual well woman exam      GYN:  · Menses are regular lasting approximately 5 days  No intermenstrual bleeding, or spotting  · No vaginal discharge, labial erythema or lesions, dyspareunia other than vaginal discharge after her colposcopy procedure  · Contraception: She would like to restart Depo at this time  She is not interested in IUDs or Implants and states that she cannot remember to to take pills or change patches  She would like to restart Depo as it worked well for her previously  · Patient is sexually active  She states that she just got out of  5 year relationship with a female partner  She has recently begun dating a new male partner  · Denies gynecologic surgeries  OB:  ·  female  · Pregnancies were uncomplicated  :  · Denies dysuria, urinary frequency or urgency  · Denies hematuria, flank pain, incontinence  Breast:  · Denies breast mass, skin changes, dimpling, reddening, nipple retraction  · Denies breast discharge  · Patient does not know her family health history  General:  · Diet: "horrible"  She states that she usually eats once as day  This is usually a large meal  She states that she cannot eat unless she smokes marijuana because she doesn't have an appetite  · 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)  · Work:    · ETOH use: occasional   · Tobacco use: no  · Recreational drug use: Smokes Marijuana daily    Screening:  · Cervical cancer: last pap smear in 2018  Results were LSIL cannot exclude high grade lesion  Colposcopy performed on 7/15 showed HSIL/CIN2 at 12 o'clock and mild cytologic atypia (cannot rule out LGSIL/CIN1) at 4 o'clock  Plan for LEEP  · STD screening: Requested today  Review of Systems   Constitutional: Negative for chills and fever  Respiratory: Negative for shortness of breath      Cardiovascular: Negative for chest pain and palpitations  Gastrointestinal: Negative for abdominal pain, constipation, diarrhea, nausea and vomiting  Genitourinary: Negative for difficulty urinating and vaginal bleeding  Neurological: Negative for dizziness, light-headedness and headaches  Objective      /78   Pulse 83   Ht 5' (1 524 m)   Wt 66 2 kg (146 lb)   LMP 07/06/2019   BMI 28 51 kg/m²   Physical Exam   Constitutional: She is oriented to person, place, and time  She appears well-developed and well-nourished  No distress  Cardiovascular: Normal rate, regular rhythm and normal heart sounds  Exam reveals no gallop and no friction rub  No murmur heard  Pulmonary/Chest: Effort normal and breath sounds normal  No stridor  No respiratory distress  She has no wheezes  She exhibits no tenderness  Right breast exhibits no inverted nipple, no mass and no skin change  Left breast exhibits no inverted nipple, no mass and no skin change  No breast swelling, tenderness, discharge or bleeding  Breasts are symmetrical    Abdominal: Soft  She exhibits no distension  There is no tenderness  Genitourinary: Vagina normal and uterus normal  Rectal exam shows no external hemorrhoid  No breast swelling, tenderness, discharge or bleeding  There is no rash, tenderness or lesion on the right labia  There is no rash, tenderness or lesion on the left labia  Uterus is not deviated, not enlarged, not fixed and not tender  Right adnexum displays no mass, no tenderness and no fullness  Left adnexum displays no mass, no tenderness and no fullness  No erythema, tenderness or bleeding in the vagina  No vaginal discharge found  Neurological: She is alert and oriented to person, place, and time  Skin: Skin is warm and dry  She is not diaphoretic  Psychiatric: She has a normal mood and affect   Her behavior is normal                Assessment/Plan  Problem List Items Addressed This Visit        Other    Encounter for gynecological examination with abnormal finding - Primary     Routine examination completed, no discrete abnormalities  Previously completed Pap and Colposcopy with plans for LEEP  Encouraged healthy lifestyle and relationships   Will restart Depo         Low grade squamous intraepithelial lesion (LGSIL) at risk for high grade squamous intraepithelial lesion (HGSIL) on cytologic smear of cervix     History:   7/11/18: Pap (Results: LSIL cannot exclude high grade lesion)  7/15/19: Colposcopy w/ repeat PAP (Results: HSIL, CIN2 at 1200, TOÑO 1 at 0400, ECC negative)    Plan for LEEP procedure  Patient counseled and signed consent for today  Met with [facility] scheduling  Patient will return for H&P in 2 weeks  Information about LEEP provided in AVS  All questions were answered at this time  Encounter for initial prescription of injectable contraceptive     Will restart Depo today  Negative UPT  Patient is aware of side effect profile  Relevant Medications    medroxyPROGESTERone (DEPO-PROVERA) 150 mg/mL injection    Marijuana use     Discouraged use of marijuana daily  Encouraged health diet with more than 1 meal daily of appropriate portion sizes         High risk sexual behavior     [facility] collected today  HIV, RPR, Hep B ordered  Encouraged safe sex practices         Relevant Orders    HIV 1/2 AG-AB combo    RPR    Hepatitis B surface antigen          Laura López MD  OB/GYN  7/22/2019  10:41 AM

## 2019-07-18 LAB
LAB AP GYN PRIMARY INTERPRETATION: ABNORMAL
Lab: ABNORMAL
PATH INTERP SPEC-IMP: ABNORMAL

## 2019-07-22 ENCOUNTER — OFFICE VISIT (OUTPATIENT)
Dept: OBGYN CLINIC | Facility: CLINIC | Age: 24
End: 2019-07-22

## 2019-07-22 VITALS
DIASTOLIC BLOOD PRESSURE: 78 MMHG | SYSTOLIC BLOOD PRESSURE: 119 MMHG | BODY MASS INDEX: 28.66 KG/M2 | HEART RATE: 83 BPM | WEIGHT: 146 LBS | HEIGHT: 60 IN

## 2019-07-22 DIAGNOSIS — Z01.411 ENCOUNTER FOR GYNECOLOGICAL EXAMINATION WITH ABNORMAL FINDING: ICD-10-CM

## 2019-07-22 DIAGNOSIS — Z72.51 HIGH RISK SEXUAL BEHAVIOR, UNSPECIFIED TYPE: ICD-10-CM

## 2019-07-22 DIAGNOSIS — R87.612 LOW GRADE SQUAMOUS INTRAEPITHELIAL LESION (LGSIL) AT RISK FOR HIGH GRADE SQUAMOUS INTRAEPITHELIAL LESION (HGSIL) ON CYTOLOGIC SMEAR OF CERVIX: ICD-10-CM

## 2019-07-22 DIAGNOSIS — Z30.013 ENCOUNTER FOR INITIAL PRESCRIPTION OF INJECTABLE CONTRACEPTIVE: Primary | ICD-10-CM

## 2019-07-22 DIAGNOSIS — F12.90 MARIJUANA USE: ICD-10-CM

## 2019-07-22 DIAGNOSIS — Z11.3 SCREEN FOR SEXUALLY TRANSMITTED DISEASES: ICD-10-CM

## 2019-07-22 PROBLEM — N87.9 CERVICAL DYSPLASIA: Status: ACTIVE | Noted: 2019-07-22

## 2019-07-22 LAB — SL AMB POCT URINE HCG: NORMAL

## 2019-07-22 PROCEDURE — 96372 THER/PROPH/DIAG INJ SC/IM: CPT | Performed by: OBSTETRICS & GYNECOLOGY

## 2019-07-22 PROCEDURE — 99395 PREV VISIT EST AGE 18-39: CPT | Performed by: OBSTETRICS & GYNECOLOGY

## 2019-07-22 PROCEDURE — 3725F SCREEN DEPRESSION PERFORMED: CPT | Performed by: OBSTETRICS & GYNECOLOGY

## 2019-07-22 PROCEDURE — 81025 URINE PREGNANCY TEST: CPT | Performed by: OBSTETRICS & GYNECOLOGY

## 2019-07-22 PROCEDURE — 87491 CHLMYD TRACH DNA AMP PROBE: CPT | Performed by: OBSTETRICS & GYNECOLOGY

## 2019-07-22 PROCEDURE — 87591 N.GONORRHOEAE DNA AMP PROB: CPT | Performed by: OBSTETRICS & GYNECOLOGY

## 2019-07-22 RX ORDER — MEDROXYPROGESTERONE ACETATE 150 MG/ML
150 INJECTION, SUSPENSION INTRAMUSCULAR
Status: SHIPPED | OUTPATIENT
Start: 2019-07-22

## 2019-07-22 RX ORDER — MEDROXYPROGESTERONE ACETATE 150 MG/ML
150 INJECTION, SUSPENSION INTRAMUSCULAR
Qty: 1 ML | Refills: 6 | Status: SHIPPED | OUTPATIENT
Start: 2019-07-22 | End: 2020-02-12

## 2019-07-22 RX ADMIN — MEDROXYPROGESTERONE ACETATE 150 MG: 150 INJECTION, SUSPENSION INTRAMUSCULAR at 13:19

## 2019-07-22 NOTE — PATIENT INSTRUCTIONS
Loop Electrosurgical Excision Procedure   AMBULATORY CARE:   A loop electrosurgical excision procedure (LEEP)  A LEEP is a procedure to remove abnormal tissue from your cervix or vagina  The tissue can be tested for cancer or infection  You may need a LEEP if other tests have found abnormal cells on your cervix or in your vagina  How to prepare for a LEEP:  Your healthcare provider will talk to you about how to prepare for your procedure  Do not douche, use tampons, or have sex for 24 hours before the procedure  Do not put medicines in your vagina for 24 hours before the procedure  Call your healthcare provider if you have your menstrual period on the day of the procedure  You may need to wait until your period ends to have the procedure  Arrange for someone to drive you home and stay with you after your procedure  What will happen during a LEEP:   · Your healthcare provider will insert a speculum in your vagina  A speculum is an instrument that holds the vagina open so your provider can see your cervix  You will be given local anesthesia to numb your cervix  With local anesthesia, you may feel pressure or pushing during your procedure, but you should not feel pain  · Your healthcare provider will insert a tube with a looped wire at the end into your vagina  He or she will use this instrument to remove a sample of tissue from your cervix  You may feel pain or cramping when the sample is taken  You may also feel dizzy  Tell your healthcare provider if the dizziness gets worse  Your healthcare provider may use a paste or tools to control bleeding  What will happen after a LEEP:  Your healthcare provider will monitor you for heavy bleeding  You may have cramping, bleeding, or dark brown discharge after your procedure  These symptoms may last up to 4 weeks  Risks of a LEEP:  You may bleed more than expected or get an infection  Your menstrual periods may feel more painful after the procedure   You may have problems getting pregnant or be at risk for a miscarriage or  birth  If you do get pregnant, your baby may be underweight  Seek care immediately if:   · You have severe pain in your lower abdomen  · You soak through 1 sanitary pad in 1 hour or less  · You feel weak, dizzy, or faint  Contact your healthcare provider if:   · You have a fever, chills, or foul-smelling discharge  · You have bleeding with clots  · Your bleeding is heavier than your menstrual period  · Your pain gets worse or does not get better after you take pain medicine  · You have questions or concerns about your condition or care  Medicines:   · NSAIDs , such as ibuprofen, help decrease swelling, pain, and fever  NSAIDs can cause stomach bleeding or kidney problems in certain people  If you take blood thinner medicine, always ask your healthcare provider if NSAIDs are safe for you  Always read the medicine label and follow directions  · Take your medicine as directed  Contact your healthcare provider if you think your medicine is not helping or if you have side effects  Tell him or her if you are allergic to any medicine  Keep a list of the medicines, vitamins, and herbs you take  Include the amounts, and when and why you take them  Bring the list or the pill bottles to follow-up visits  Carry your medicine list with you in case of an emergency  Activity:  Rest for 48 hours or as directed  Do not exercise, play sports, or lift anything heavier than 5 pounds  Do not go swimming or get in a hot tub  Ask your healthcare provider when you can return to your usual activities  Bathing:  Shower only after your procedure  Do not take a bath  Baths may increase your risk for an infection  Ask your healthcare provider how long to follow these instructions  Do not put anything in your vagina for 2 weeks:  Do not douche, use medicines in your vagina, or have sex  Do not use tampons   Instead, wear a sanitary pad for bleeding  Get pap smears as directed:  A pap smear can help diagnose cervical cancer early  Cervical cancer that is diagnosed early is easier to treat  Do not smoke:  Nicotine and other chemicals in cigarettes and cigars can increase your risk for cervical cancer  Ask your healthcare provider for more information if you currently smoke and need help to quit  E-cigarettes or smokeless tobacco still contain nicotine  Talk to your healthcare provider before you use these products  Practice safe sex:  Safe sex can help decrease your risk for sexually transmitted infections (STIs)  STIs can cause cervical cancer  Limit your number of sex partners  Use condoms and barrier methods for all types of sexual contact  Use a new condom or latex barrier each time you have sex  This includes oral, vaginal, and anal sex  Make sure that the condom fits and is put on correctly  Follow up with your healthcare provider as directed:  Write down your questions so you remember to ask them during your visits  © 2017 [address] [employer]  Information is for End User's use only and may not be sold, redistributed or otherwise used for commercial purposes  All illustrations and images included in CareNotes® are the copyrighted property of [employer]  or [employer]  The above information is an  only  It is not intended as medical advice for individual conditions or treatments  Talk to your doctor, nurse or pharmacist before following any medical regimen to see if it is safe and effective for you  Loop Electrosurgical Excision Procedure   WHAT YOU NEED TO KNOW:   A loop electrosurgical excision procedure (LEEP) is used to remove abnormal tissue from your cervix or vagina  Your cervix is the opening of your uterus  Your healthcare provider will use a small wire loop that is heated by an electrical current to remove the tissue     HOW TO PREPARE:   The week before your procedure:   · Write down the correct date, time, and location of your procedure  · Arrange a ride home  Ask a family member or friend to drive you home after your surgery or procedure  Do not drive yourself home  · Ask your healthcare provider how many days before the procedure you need to stop having sex  · Ask your caregiver if you need to stop using aspirin or any other prescribed or over-the-counter medicine before your procedure or surgery  · Bring your medicine bottles or a list of your medicines when you see your caregiver  Tell your caregiver if you are allergic to any medicine  Tell your caregiver if you use any herbs, food supplements, or over-the-counter medicine  · Tell your caregiver if you know or think you might be pregnant  · Dye may be used during your procedure to sergey abnormal tissues in your vagina and cervix  Tell your healthcare provider if you have ever had an allergic reaction to dye  · You may need to have a pregnancy, urine, or blood test  Talk to your healthcare provider about these or other tests you may need  Write down the date, time, and location for each test   The day of your procedure:   · Ask your caregiver before taking any medicine on the day of your procedure  These medicines include insulin, diabetic pills, high blood pressure pills, or heart pills  Bring a list of all the medicines you take, or your pill bottles, with you to the hospital     · You or a close family member will be asked to sign a legal document called a consent form  It gives caregivers permission to do the procedure or surgery  It also explains the problems that may happen, and your choices  Make sure all your questions are answered before you sign this form  · Caregivers may insert an intravenous tube (IV) into your vein  A vein in the arm is usually chosen  Through the IV tube, you may be given liquids and medicine  · An anesthesiologist will talk to you before your surgery   You may need medicine to keep you asleep or numb an area of your body during surgery  Tell caregivers if you or anyone in your family has had a problem with anesthesia in the past   WHAT WILL HAPPEN:   What will happen:   · Your healthcare provider will ask you to urinate before the procedure  He will ask you to lie on a table and place your legs in stirrups  Your healthcare provider will insert a speculum into your vagina to widen and hold open your vagina so he can see your cervix  He will also place a scope at the opening of your vagina to help find abnormal tissue  He will also swab the tissue with acetic acid (vinegar) or iodine dye to help him see the abnormal tissue better  · Your healthcare provider will inject your cervix or vagina with medicine to numb the area  He will use forceps to hold the cervix steady during the procedure  He will insert a wire loop through your vagina to remove abnormal tissue and stop any bleeding  Tissue samples will be collected and sent to a lab for testing  He will wash the area with iodine or a saline (salt water) solution and apply medicine to decrease bleeding  You will then be given a sanitary pad to wear  After your procedure:  Healthcare providers will monitor you closely for any problems  Do not get out of bed until your healthcare provider says it is okay  When your healthcare provider sees that you are okay, you will be able to go home or be taken to your hospital room  CONTACT YOUR HEALTHCARE PROVIDER IF:   · You cannot make it to your procedure  · You have a fever  · You get a cold or the flu  · You have questions or concerns about your procedure  SEEK CARE IMMEDIATELY IF:   · You have blood, pus, or a foul odor coming out of your vagina  · You have sudden severe abdominal or vaginal pain  RISKS:   · During the procedure, the wire loop may burn or tear part of your vagina  You may have bleeding or an infection after the procedure       · Without the procedure, you may not know what is causing your medical condition  Your symptoms may become worse, and it may lead to cancer  CARE AGREEMENT:   You have the right to help plan your care  Learn about your health condition and how it may be treated  Discuss treatment options with your caregivers to decide what care you want to receive  You always have the right to refuse treatment  © 2016 [address] is for End User's use only and may not be sold, redistributed or otherwise used for commercial purposes  All illustrations and images included in CareNotes® are the copyrighted property of [employer]  or [employer] Millan  The above information is an  only  It is not intended as medical advice for individual conditions or treatments  Talk to your doctor, nurse or pharmacist before following any medical regimen to see if it is safe and effective for you

## 2019-07-22 NOTE — ASSESSMENT & PLAN NOTE
Discouraged use of marijuana daily  Encouraged health diet with more than 1 meal daily of appropriate portion sizes

## 2019-07-22 NOTE — ASSESSMENT & PLAN NOTE
[facility] collected today  HIV, RPR, Hep B ordered  Encouraged safe sex practices

## 2019-07-22 NOTE — ASSESSMENT & PLAN NOTE
History:   7/11/18: Pap (Results: LSIL cannot exclude high grade lesion)  7/15/19: Colposcopy w/ repeat PAP (Results: HSIL, CIN2 at 1200, TOÑO 1 at 0400, ECC negative)    Plan for LEEP procedure  Patient counseled and signed consent for today  Met with Macy for surgery scheduling  Patient will return for H&P in 2 weeks  Information about LEEP provided in AVS  All questions were answered at this time

## 2019-07-22 NOTE — ASSESSMENT & PLAN NOTE
Routine examination completed, no discrete abnormalities  Previously completed Pap and Colposcopy with plans for LEEP     Encouraged healthy lifestyle and relationships   Will restart [place]

## 2019-07-23 LAB
C TRACH DNA SPEC QL NAA+PROBE: NEGATIVE
N GONORRHOEA DNA SPEC QL NAA+PROBE: NEGATIVE

## 2019-08-12 ENCOUNTER — OFFICE VISIT (OUTPATIENT)
Dept: OBGYN CLINIC | Facility: CLINIC | Age: 24
End: 2019-08-12

## 2019-08-12 VITALS
HEART RATE: 72 BPM | SYSTOLIC BLOOD PRESSURE: 119 MMHG | DIASTOLIC BLOOD PRESSURE: 72 MMHG | WEIGHT: 149 LBS | HEIGHT: 60 IN | BODY MASS INDEX: 29.25 KG/M2

## 2019-08-12 DIAGNOSIS — N87.9 CERVICAL DYSPLASIA: Primary | ICD-10-CM

## 2019-08-12 PROCEDURE — 99214 OFFICE O/P EST MOD 30 MIN: CPT | Performed by: OBSTETRICS & GYNECOLOGY

## 2019-08-12 NOTE — PROGRESS NOTES
H&P Exam - Gynecology   Marita Mchugh 21 y o  female MRN: [id number]  Unit/Bed#:  Encounter: [id number]      History of Present Illness     HPI:  Marita Mchugh is a 21 y o  female who presents with preoperative H&P  Patient Pap smear reported as low-grade AME with unable to exclude high-grade lesion and she received colposcopy which was HSIL, TOÑO 2 at 12 oclock  Patient get information about recommended procedure 'LEEP'  She is aware the possible complication which is bleeding, infection, possible labor continued, necessity of repetition of the procedure due to positive surgical margin, possibly   and cervical insufficiency in future pregnancies  Patient is sexually active and obstetric history is significant with 1 term vaginal delivery  Review of Systems   Constitutional: Negative  HENT: Negative  Respiratory: Negative  Cardiovascular: Negative  Gastrointestinal: Negative  Genitourinary: Negative  Neurological: Negative  Historical Information   Past Medical History:   Diagnosis Date         Bipolar disorder ([facility] [address] )     Migraine      Past Surgical History:   Procedure Laterality Date     SECTION      MASS EXCISION Right 2018    Procedure: EXCISION BIOPSY TISSUE LESION/MASS UPPER EXTREMITY  thumb - DEEP 2CM X 2 CM X 1 CM;   Surgeon: Boston Starr MD;  Location: [facility] OR;  Service: Orthopedics     OB/GYN History: 1 term vaginal delivery  Family History   Problem Relation Age of Onset    Diabetes Family     Hypertension Mother     No Known Problems Father      Social History   Social History     Substance and Sexual Activity   Alcohol Use Yes    Comment: occasionally     Social History     Substance and Sexual Activity   Drug Use Yes    Types: Marijuana    Comment: still current user,  instructed to abstain      Social History     Tobacco Use   Smoking Status Former Smoker    Packs/day: 0 25    Last attempt to quit: 10/13/2017    Years since quittin 8   Smokeless Tobacco Never Used   Tobacco Comment    pt was a social smoker, quit 6 months ago       Meds/Allergies     (Not in a hospital admission)  No Known Allergies    Objective   /72 (BP Location: Left arm, Patient Position: Sitting, Cuff Size: Standard)   Pulse 72   Ht 5' (1 524 m)   Wt 67 6 kg (149 lb)   LMP 2019 (Exact Date)   BMI 29 10 kg/m²       Physical Exam   Constitutional: She is oriented to person, place, and time  She appears well-developed and well-nourished  No distress  Cardiovascular: Normal rate, regular rhythm and normal heart sounds  Exam reveals no friction rub  No murmur heard  Pulmonary/Chest: Effort normal and breath sounds normal  No stridor  No respiratory distress  She has no wheezes  Abdominal: Soft  Genitourinary:   Genitourinary Comments: Uterus in normal size, retroverted   Musculoskeletal: Normal range of motion  Neurological: She is alert and oriented to person, place, and time  Skin: Skin is warm  She is not diaphoretic  Psychiatric: She has a normal mood and affect  Her behavior is normal        Lab Results:   No visits with results within 1 Day(s) from this visit  Latest known visit with results is:   Office Visit on 2019   Component Date Value    URINE HCG 2019 neg     N gonorrhoeae, DNA Probe 2019 Negative     Chlamydia trachomatis, D* 2019 Negative       Imaging: I have personally reviewed pertinent reports  EKG, Pathology, and Other Studies: I have personally reviewed pertinent reports        Assessment/Plan     A/P: LEEP procedure preop H/P  1) LGSIL that not exclude high grade lesion,colposcopy CIN2 at 12 oclock  2) LEEP procedure and possible complication short and long term discussed, pt agreeing proceed surgery  3) She is feeling tired and sleepy, recent unprotected intercourse, UPT and TSH ordered  4) All questions regarding surgery answered    Code Status: Full code    Angeli Crowe MD  6/39/0930  9:11 AM

## 2019-08-27 ENCOUNTER — TELEPHONE (OUTPATIENT)
Dept: OBGYN CLINIC | Facility: CLINIC | Age: 24
End: 2019-08-27

## 2019-09-03 PROBLEM — R87.619 ABNORMAL CELLS OF CERVIX: Status: ACTIVE | Noted: 2019-09-03

## 2019-09-17 ENCOUNTER — OFFICE VISIT (OUTPATIENT)
Dept: OBGYN CLINIC | Facility: CLINIC | Age: 24
End: 2019-09-17

## 2019-09-17 VITALS
BODY MASS INDEX: 29.06 KG/M2 | SYSTOLIC BLOOD PRESSURE: 115 MMHG | HEIGHT: 60 IN | WEIGHT: 148 LBS | HEART RATE: 71 BPM | DIASTOLIC BLOOD PRESSURE: 80 MMHG

## 2019-09-17 DIAGNOSIS — Z01.818 PREOPERATIVE EXAM FOR GYNECOLOGIC SURGERY: Primary | ICD-10-CM

## 2019-09-17 PROCEDURE — 99213 OFFICE O/P EST LOW 20 MIN: CPT | Performed by: OBSTETRICS & GYNECOLOGY

## 2019-09-17 NOTE — PROGRESS NOTES
H&P Exam - Gynecology   Arnie Corbett 21 y o  female MRN: [id number]    History of Present Illness     HPI:  Arnie Corbett is a  21 y o  female who presents for pre-operative H&P for LEEP  Pap smear done in 7/15/19 showed LSIL, unable to exclude high-grade lesion  Subsequent colposcopy showed HSIL, CIN2 at the 12 o'clock position  Patient was scheduled to get a LEEP earlier but had to change her procedure date secondary to change in insurance  LEEP consent was signed on 19  Reviewed possible risks including but not limited to bleeding, infection, need for repeat procedure  Patient expresses understanding and has no additional questions at this time  Patient denies any changes since her last H&P exam  She states that she has a new sexual partner and that she has noted a change in odor of vaginal discharge  She also reports mild spotting after intercourse in certain positions  Patient also reports that she would like two skin tags removed at the time of her LEEP if possible  Review of Systems   Constitutional: Negative for diaphoresis and fever  Respiratory: Negative for apnea, shortness of breath and wheezing  Cardiovascular: Negative for chest pain and palpitations  Gastrointestinal: Negative for blood in stool and vomiting  Genitourinary: Negative for dysuria and hematuria  Neurological: Negative for dizziness and numbness  Historical Information   Past Medical History:   Diagnosis Date    Abnormal Pap smear of cervix          [facility] [address] [facility]     Migraine      Past Surgical History:   Procedure Laterality Date     SECTION      MASS EXCISION Right 2018    Procedure: EXCISION BIOPSY TISSUE LESION/MASS UPPER EXTREMITY  thumb - DEEP 2CM X 2 CM X 1 CM;   Surgeon: Terry Munroe MD;  Location:  MAIN OR;  Service: Orthopedics     Family History   Problem Relation Age of Onset    Diabetes Family     Hypertension Mother  No Known Problems Father      Social History   Social History     Substance and Sexual Activity   Alcohol Use Yes    Comment: occasionally     Social History     Substance and Sexual Activity   Drug Use Yes    Types: Marijuana    Comment: still current user,  instructed to abstain      Social History     Tobacco Use   Smoking Status Former Smoker    Packs/day: 0 25    Last attempt to quit: 10/13/2017    Years since quittin 9   Smokeless Tobacco Never Used   Tobacco Comment    pt was a social smoker, quit 6 months ago       Meds/Allergies     (Not in a hospital admission)  No Known Allergies    Objective   /80 (BP Location: Left arm, Patient Position: Sitting, Cuff Size: Standard)   Pulse 71   Ht 5' (1 524 m)   Wt 67 1 kg (148 lb)   LMP 2019  BMI 28 90 kg/m²     Physical Exam   Constitutional: She is oriented to person, place, and time  She appears well-nourished  HENT:   Head: Normocephalic and atraumatic  Eyes: Pupils are equal, round, and reactive to light  EOM are normal    Cardiovascular: Normal rate, regular rhythm and normal heart sounds  Pulmonary/Chest: Breath sounds normal  No respiratory distress  She has no wheezes  Genitourinary: Vagina normal and uterus normal          Genitourinary Comments: Small skin tag noted at positions indicated  Normal vaginal discharge noted  No other lesions identified; no bleeding noted on speculum exam   Neurological: She is alert and oriented to person, place, and time  Skin: Skin is warm and dry  Nursing note and vitals reviewed  Lab Results:   No visits with results within 1 Day(s) from this visit     Latest known visit with results is:   Office Visit on 2019   Component Date Value    URINE HCG 2019 neg     N gonorrhoeae, DNA Probe 2019 Negative     Chlamydia trachomatis, D* 2019 Negative       Assessment/Plan     A/P: Jacob Landrum is a 23y/o  who presents for pre-operative H&P for LEEP procedure   Procedure scheduled for 9/27/19     - Patient to return to clinic 2 weeks after LEEP for results and post-op evaluation  - All questions answered      Alejandro Mariscal MD  9/17/2019  2:36 PM

## 2019-09-17 NOTE — H&P (VIEW-ONLY)
H&P Exam - Gynecology   Vernon Drummond 21 y o  female MRN: [id number]    History of Present Illness     HPI:  Vernon Drummond is a  21 y o  female who presents for pre-operative H&P for LEEP  Pap smear done in 7/15/19 showed LSIL, unable to exclude high-grade lesion  Subsequent colposcopy showed HSIL, CIN2 at the 12 o'clock position  Patient was scheduled to get a LEEP earlier but had to change her procedure date secondary to change in insurance  LEEP consent was signed on 19  Reviewed possible risks including but not limited to bleeding, infection, need for repeat procedure  Patient expresses understanding and has no additional questions at this time  Patient denies any changes since her last H&P exam  She states that she has a new sexual partner and that she has noted a change in odor of vaginal discharge  She also reports mild spotting after intercourse in certain positions  Patient also reports that she would like two skin tags removed at the time of her LEEP if possible  Review of Systems   Constitutional: Negative for diaphoresis and fever  Respiratory: Negative for apnea, shortness of breath and wheezing  Cardiovascular: Negative for chest pain and palpitations  Gastrointestinal: Negative for blood in stool and vomiting  Genitourinary: Negative for dysuria and hematuria  Neurological: Negative for dizziness and numbness  Historical Information   Past Medical History:   Diagnosis Date    Abnormal Pap smear of cervix          B[facility] disorder ([facility] [address] )     Migraine      Past Surgical History:   Procedure Laterality Date     SECTION      MASS EXCISION Right 2018    Procedure: EXCISION BIOPSY TISSUE LESION/MASS UPPER EXTREMITY  thumb - DEEP 2CM X 2 CM X 1 CM;   Surgeon: Felicity Cranker, MD;  Location:  MAIN OR;  Service: Orthopedics     Family History   Problem Relation Age of Onset    Diabetes Family     Hypertension Mother  No Known Problems Father      Social History   Social History     Substance and Sexual Activity   Alcohol Use Yes    Comment: occasionally     Social History     Substance and Sexual Activity   Drug Use Yes    Types: Marijuana    Comment: still current user,  instructed to abstain      Social History     Tobacco Use   Smoking Status Former Smoker    Packs/day: 0 25    Last attempt to quit: 10/13/2017    Years since quittin 9   Smokeless Tobacco Never Used   Tobacco Comment    pt was a social smoker, quit 6 months ago       Meds/Allergies     (Not in a hospital admission)  No Known Allergies    Objective   /80 (BP Location: Left arm, Patient Position: Sitting, Cuff Size: Standard)   Pulse 71   Ht 5' (1 524 m)   Wt 67 1 kg (148 lb)   LMP 2019  BMI 28 90 kg/m²     Physical Exam   Constitutional: She is oriented to person, place, and time  She appears well-nourished  HENT:   Head: Normocephalic and atraumatic  Eyes: Pupils are equal, round, and reactive to light  EOM are normal    Cardiovascular: Normal rate, regular rhythm and normal heart sounds  Pulmonary/Chest: Breath sounds normal  No respiratory distress  She has no wheezes  Genitourinary: Vagina normal and uterus normal          Genitourinary Comments: Small skin tag noted at positions indicated  Normal vaginal discharge noted  No other lesions identified; no bleeding noted on speculum exam   Neurological: She is alert and oriented to person, place, and time  Skin: Skin is warm and dry  Nursing note and vitals reviewed  Lab Results:   No visits with results within 1 Day(s) from this visit     Latest known visit with results is:   Office Visit on 2019   Component Date Value    URINE HCG 2019 neg     N gonorrhoeae, DNA Probe 2019 Negative     Chlamydia trachomatis, D* 2019 Negative       Assessment/Plan     A/P: Dustin Mendenhall is a 25y/o  who presents for pre-operative H&P for LEEP procedure   Procedure scheduled for 9/27/19     - Patient to return to clinic 2 weeks after LEEP for results and post-op evaluation  - All questions answered      Luigi Castro MD  9/17/2019  2:36 PM

## 2019-09-19 NOTE — PRE-PROCEDURE INSTRUCTIONS
Pre-Surgery Instructions:   Medication Instructions    medroxyPROGESTERone (DEPO-PROVERA) 150 mg/mL injection Patient was instructed by Physician and understands  Review via phone medications and showers instructions  Week before surgery don't take  Motrin,ibuprofen,advil,aleve  OK to use tylenol as needed  Nothing to eat or drink after MN 9/27 in am  is ok  to take tylenol prn with 1-2 sips of water  Advises to stop alcohol and marijuana use  [place] phone number provide  Verbalized understanding

## 2019-09-26 ENCOUNTER — ANESTHESIA EVENT (OUTPATIENT)
Dept: PERIOP | Facility: HOSPITAL | Age: 24
End: 2019-09-26
Payer: COMMERCIAL

## 2019-09-27 ENCOUNTER — HOSPITAL ENCOUNTER (OUTPATIENT)
Facility: HOSPITAL | Age: 24
Setting detail: OUTPATIENT SURGERY
Discharge: HOME | End: 2019-09-27
Attending: OBSTETRICS & GYNECOLOGY | Admitting: OBSTETRICS & GYNECOLOGY
Payer: COMMERCIAL

## 2019-09-27 ENCOUNTER — ANESTHESIA (OUTPATIENT)
Dept: PERIOP | Facility: HOSPITAL | Age: 24
End: 2019-09-27
Payer: COMMERCIAL

## 2019-09-27 VITALS
DIASTOLIC BLOOD PRESSURE: 73 MMHG | TEMPERATURE: 98 F | OXYGEN SATURATION: 100 % | HEART RATE: 83 BPM | SYSTOLIC BLOOD PRESSURE: 111 MMHG | RESPIRATION RATE: 20 BRPM | HEIGHT: 60 IN | BODY MASS INDEX: 29.64 KG/M2 | WEIGHT: 151 LBS

## 2019-09-27 DIAGNOSIS — R87.619 ABNORMAL CELLS OF CERVIX: Primary | ICD-10-CM

## 2019-09-27 LAB
EXT PREGNANCY TEST URINE: NEGATIVE
EXT. CONTROL: NORMAL

## 2019-09-27 PROCEDURE — 88305 TISSUE EXAM BY PATHOLOGIST: CPT | Performed by: PATHOLOGY

## 2019-09-27 PROCEDURE — 11420 EXC H-F-NK-SP B9+MARG 0.5/<: CPT | Performed by: OBSTETRICS & GYNECOLOGY

## 2019-09-27 PROCEDURE — 88307 TISSUE EXAM BY PATHOLOGIST: CPT | Performed by: PATHOLOGY

## 2019-09-27 PROCEDURE — 57522 CONIZATION OF CERVIX: CPT | Performed by: OBSTETRICS & GYNECOLOGY

## 2019-09-27 PROCEDURE — 81025 URINE PREGNANCY TEST: CPT | Performed by: OBSTETRICS & GYNECOLOGY

## 2019-09-27 RX ORDER — IBUPROFEN 600 MG/1
600 TABLET ORAL EVERY 6 HOURS PRN
Status: DISCONTINUED | OUTPATIENT
Start: 2019-09-27 | End: 2019-09-27 | Stop reason: HOSPADM

## 2019-09-27 RX ORDER — ONDANSETRON 2 MG/ML
INJECTION INTRAMUSCULAR; INTRAVENOUS AS NEEDED
Status: DISCONTINUED | OUTPATIENT
Start: 2019-09-27 | End: 2019-09-27 | Stop reason: SURG

## 2019-09-27 RX ORDER — KETOROLAC TROMETHAMINE 30 MG/ML
INJECTION, SOLUTION INTRAMUSCULAR; INTRAVENOUS AS NEEDED
Status: DISCONTINUED | OUTPATIENT
Start: 2019-09-27 | End: 2019-09-27 | Stop reason: SURG

## 2019-09-27 RX ORDER — SUCCINYLCHOLINE/SOD CL,ISO/PF 100 MG/5ML
SYRINGE (ML) INTRAVENOUS AS NEEDED
Status: DISCONTINUED | OUTPATIENT
Start: 2019-09-27 | End: 2019-09-27 | Stop reason: SURG

## 2019-09-27 RX ORDER — SODIUM CHLORIDE, SODIUM LACTATE, POTASSIUM CHLORIDE, CALCIUM CHLORIDE 600; 310; 30; 20 MG/100ML; MG/100ML; MG/100ML; MG/100ML
75 INJECTION, SOLUTION INTRAVENOUS CONTINUOUS
Status: DISCONTINUED | OUTPATIENT
Start: 2019-09-27 | End: 2019-09-27 | Stop reason: HOSPADM

## 2019-09-27 RX ORDER — LIDOCAINE HYDROCHLORIDE 10 MG/ML
INJECTION, SOLUTION EPIDURAL; INFILTRATION; INTRACAUDAL; PERINEURAL AS NEEDED
Status: DISCONTINUED | OUTPATIENT
Start: 2019-09-27 | End: 2019-09-27 | Stop reason: SURG

## 2019-09-27 RX ORDER — ACETAMINOPHEN 325 MG/1
650 TABLET ORAL EVERY 6 HOURS PRN
Status: DISCONTINUED | OUTPATIENT
Start: 2019-09-27 | End: 2019-09-27 | Stop reason: HOSPADM

## 2019-09-27 RX ORDER — SODIUM CHLORIDE, SODIUM LACTATE, POTASSIUM CHLORIDE, CALCIUM CHLORIDE 600; 310; 30; 20 MG/100ML; MG/100ML; MG/100ML; MG/100ML
50 INJECTION, SOLUTION INTRAVENOUS CONTINUOUS
Status: DISCONTINUED | OUTPATIENT
Start: 2019-09-27 | End: 2019-09-27

## 2019-09-27 RX ORDER — PROPOFOL 10 MG/ML
INJECTION, EMULSION INTRAVENOUS CONTINUOUS PRN
Status: DISCONTINUED | OUTPATIENT
Start: 2019-09-27 | End: 2019-09-27

## 2019-09-27 RX ORDER — ONDANSETRON 2 MG/ML
4 INJECTION INTRAMUSCULAR; INTRAVENOUS ONCE AS NEEDED
Status: DISCONTINUED | OUTPATIENT
Start: 2019-09-27 | End: 2019-09-27 | Stop reason: HOSPADM

## 2019-09-27 RX ORDER — EPHEDRINE SULFATE 50 MG/ML
INJECTION INTRAVENOUS AS NEEDED
Status: DISCONTINUED | OUTPATIENT
Start: 2019-09-27 | End: 2019-09-27 | Stop reason: SURG

## 2019-09-27 RX ORDER — FENTANYL CITRATE 50 UG/ML
INJECTION, SOLUTION INTRAMUSCULAR; INTRAVENOUS AS NEEDED
Status: DISCONTINUED | OUTPATIENT
Start: 2019-09-27 | End: 2019-09-27 | Stop reason: SURG

## 2019-09-27 RX ORDER — SODIUM CHLORIDE, SODIUM LACTATE, POTASSIUM CHLORIDE, CALCIUM CHLORIDE 600; 310; 30; 20 MG/100ML; MG/100ML; MG/100ML; MG/100ML
INJECTION, SOLUTION INTRAVENOUS CONTINUOUS PRN
Status: DISCONTINUED | OUTPATIENT
Start: 2019-09-27 | End: 2019-09-27

## 2019-09-27 RX ORDER — ONDANSETRON 2 MG/ML
4 INJECTION INTRAMUSCULAR; INTRAVENOUS EVERY 6 HOURS PRN
Status: DISCONTINUED | OUTPATIENT
Start: 2019-09-27 | End: 2019-09-27 | Stop reason: HOSPADM

## 2019-09-27 RX ORDER — PROPOFOL 10 MG/ML
INJECTION, EMULSION INTRAVENOUS AS NEEDED
Status: DISCONTINUED | OUTPATIENT
Start: 2019-09-27 | End: 2019-09-27 | Stop reason: SURG

## 2019-09-27 RX ORDER — IODINE SOLUTION STRONG 5% (LUGOL'S) 5 %
SOLUTION ORAL AS NEEDED
Status: DISCONTINUED | OUTPATIENT
Start: 2019-09-27 | End: 2019-09-27 | Stop reason: HOSPADM

## 2019-09-27 RX ORDER — FENTANYL CITRATE/PF 50 MCG/ML
25 SYRINGE (ML) INJECTION
Status: DISCONTINUED | OUTPATIENT
Start: 2019-09-27 | End: 2019-09-27 | Stop reason: HOSPADM

## 2019-09-27 RX ORDER — DEXAMETHASONE SODIUM PHOSPHATE 10 MG/ML
INJECTION, SOLUTION INTRAMUSCULAR; INTRAVENOUS AS NEEDED
Status: DISCONTINUED | OUTPATIENT
Start: 2019-09-27 | End: 2019-09-27 | Stop reason: SURG

## 2019-09-27 RX ORDER — IBUPROFEN 200 MG
600 TABLET ORAL EVERY 6 HOURS PRN
Refills: 0
Start: 2019-09-27 | End: 2020-02-12

## 2019-09-27 RX ORDER — LIDOCAINE HYDROCHLORIDE 10 MG/ML
0.5 INJECTION, SOLUTION EPIDURAL; INFILTRATION; INTRACAUDAL; PERINEURAL ONCE AS NEEDED
Status: DISCONTINUED | OUTPATIENT
Start: 2019-09-27 | End: 2019-09-27

## 2019-09-27 RX ADMIN — FENTANYL CITRATE 25 MCG: 50 INJECTION INTRAMUSCULAR; INTRAVENOUS at 18:00

## 2019-09-27 RX ADMIN — PROPOFOL 100 MCG/KG/MIN: 10 INJECTION, EMULSION INTRAVENOUS at 17:02

## 2019-09-27 RX ADMIN — IBUPROFEN 600 MG: 600 TABLET, FILM COATED ORAL at 19:05

## 2019-09-27 RX ADMIN — LIDOCAINE HYDROCHLORIDE 50 MG: 10 INJECTION, SOLUTION EPIDURAL; INFILTRATION; INTRACAUDAL; PERINEURAL at 16:57

## 2019-09-27 RX ADMIN — KETOROLAC TROMETHAMINE 30 MG: 30 INJECTION, SOLUTION INTRAMUSCULAR at 17:26

## 2019-09-27 RX ADMIN — EPHEDRINE SULFATE 5 MG: 50 INJECTION, SOLUTION INTRAVENOUS at 17:37

## 2019-09-27 RX ADMIN — SODIUM CHLORIDE, SODIUM LACTATE, POTASSIUM CHLORIDE, AND CALCIUM CHLORIDE: .6; .31; .03; .02 INJECTION, SOLUTION INTRAVENOUS at 16:52

## 2019-09-27 RX ADMIN — PHENYLEPHRINE HYDROCHLORIDE 100 MCG: 10 INJECTION INTRAVENOUS at 17:24

## 2019-09-27 RX ADMIN — FENTANYL CITRATE 100 MCG: 50 INJECTION, SOLUTION INTRAMUSCULAR; INTRAVENOUS at 17:06

## 2019-09-27 RX ADMIN — FENTANYL CITRATE 25 MCG: 50 INJECTION INTRAMUSCULAR; INTRAVENOUS at 17:57

## 2019-09-27 RX ADMIN — ONDANSETRON 4 MG: 2 INJECTION INTRAMUSCULAR; INTRAVENOUS at 17:08

## 2019-09-27 RX ADMIN — Medication 100 MG: at 16:57

## 2019-09-27 RX ADMIN — PROPOFOL 50 MG: 10 INJECTION, EMULSION INTRAVENOUS at 17:18

## 2019-09-27 RX ADMIN — DEXAMETHASONE SODIUM PHOSPHATE 10 MG: 10 INJECTION, SOLUTION INTRAMUSCULAR; INTRAVENOUS at 17:08

## 2019-09-27 RX ADMIN — PROPOFOL 150 MG: 10 INJECTION, EMULSION INTRAVENOUS at 16:57

## 2019-09-27 NOTE — DISCHARGE INSTRUCTIONS
Loop Electrosurgical Excision Procedure   WHAT YOU NEED TO KNOW:   A loop electrosurgical excision procedure (LEEP) is used to remove abnormal tissue from your cervix or vagina  Your cervix is the opening of your uterus  Your healthcare provider will use a small wire loop that is heated by an electrical current to remove the tissue  DISCHARGE INSTRUCTIONS:   Medicines: You may need any of the following:  · Acetaminophen  decreases pain  It is available without a doctor's order  Ask how much to take and how often to take it  Follow directions  Acetaminophen can cause liver damage if not taken correctly  · NSAIDs  decrease pain and swelling  This medicine is available without a doctor's order  This medicine can cause stomach bleeding or kidney problems  If you take blood thinner medicine, always ask your healthcare provider if NSAIDs are safe for you  Always read the medicine label and follow the directions on it before you use this medicine  · Take your medicine as directed  Contact your healthcare provider if you think your medicine is not helping or if you have side effects  Tell him if you are allergic to any medicine  Keep a list of the medicines, vitamins, and herbs you take  Include the amounts, and when and why you take them  Bring the list or the pill bottles to follow-up visits  Carry your medicine list with you in case of an emergency  Follow up with your healthcare provider or gynecologist as directed:  Write down your questions so you remember to ask them during your visits  Rest:  Rest when you feel it is needed  Slowly start to do more each day  Return to your daily activities as directed  Vaginal care: It is normal to have mild cramping, spotting, or discharge for several days after your procedure  You may also have a thin, watery discharge for up to 4 weeks after your procedure  Use a clean sanitary pad as needed   Do not  use tampons, douche, or have sex until your healthcare provider or gynecologist says that it is okay  Bathing:  Do not take a bath or use a hot tub for 2 weeks after your procedure, or as directed by your healthcare provider or gynecologist  Kati Hernandez may shower during this time  Contact your healthcare provider or gynecologist if:   · You have a fever or chills  · You have nausea or are vomiting  · You have blood in your urine  · Your pad becomes soaked with blood  · You have foul-smelling drainage from your vagina  · You have pain when you urinate or have sex  · You have questions or concerns about your condition or care  Seek care immediately or call 911 if:   · You have heavy bleeding from your vagina  · You have severe abdominal or vaginal pain that does not go away, even after you take pain medicine  · You are urinating less than before your procedure

## 2019-09-27 NOTE — ANESTHESIA PREPROCEDURE EVALUATION
Review of Systems/Medical History  Patient summary reviewed  Chart reviewed  No history of anesthetic complications     Cardiovascular  Negative cardio ROS    Pulmonary  Negative pulmonary ROS        GI/Hepatic  Negative GI/hepatic ROS          Negative  ROS        Endo/Other  Negative endo/other ROS      GYN       Hematology  Negative hematology ROS      Musculoskeletal  Negative musculoskeletal ROS        Neurology  Negative neurology ROS      Psychology   Negative psychology ROS              Physical Exam    Airway    Mallampati score: II  TM Distance: >3 FB  Neck ROM: full     Dental   No notable dental hx     Cardiovascular  Comment: Negative ROS,     Pulmonary  Pulmonary exam normal     Other Findings        Anesthesia Plan  ASA Score- 2     Anesthesia Type- general with ASA Monitors  Additional Monitors:   Airway Plan: ETT  Comment: General anesthesia, endotracheal tube; standard ASA monitors  Risks and benefits discussed with patient; patient consented and agrees to proceed  I saw and evaluated the patient  If seen with CRNA, we have discussed the anesthetic plan and I am in agreement that the plan is appropriate for the patient  upt neg 9/27/19  Plan Factors-    Induction- intravenous  Postoperative Plan- Plan for postoperative opioid use  Planned trial extubation    Informed Consent- Anesthetic plan and risks discussed with patient  I personally reviewed this patient with the CRNA  Discussed and agreed on the Anesthesia Plan with the CRNA  Hanna Sanon

## 2019-09-27 NOTE — OP NOTE
OPERATIVE REPORT  PATIENT NAME: Stanislav Brown    :  1995  MRN: [id number]  Pt Location: [facility] OR ROOM 09    SURGERY DATE: 2019    Surgeon(s) and Role:     * Robbi Saint, MD - Primary     * Babs Wong MD - Assisting    Preop Diagnosis:  Abnormal cells of cervix [N87 9]    Post-Op Diagnosis Codes:     * Abnormal cells of cervix [N87 9]    Procedure(s) (LRB):  BIOPSY LEEP CERVIX (N/A)   Removal of vulvar lesions    Specimen(s):  ID Type Source Tests Collected by Time Destination   1 : Left Vulva Tissue Vulva TISSUE EXAM Robbi Saint, MD 2019 1710    2 : Right Vulva Tissue Vulva TISSUE EXAM Robbi Saint, MD 2019 1711    3 : Cervix Biopsy (Tag at 12 O'Clock) Tissue Cervix TISSUE EXAM Robbi Saint, MD 2019 1725    4 : Ecto-Cervix (at 9 O'Clock) Tissue Cervix TISSUE EXAM Robbi Saint, MD 2019 1726        Estimated Blood Loss:   Minimal    Drains:  * No LDAs found *    Anesthesia Type:   General    Operative Indications:  Abnormal cells of cervix [N87 9]  CIN2    Operative Findings:  Normal external genitalia, with small skin tags noted on left anterior vulva and right posterior vulva  Normal vaginal mucosa, no lesions  Mobile retroverted uterus  Cervix with abnormalities with iodine solution noted from 12 o'clock to 4 o'clock    Complications:   None    Procedure and Technique:  Indications for the procedure:   History: Ms Stanislav Brown is a 21y o  year old G 2 P 1011 with a Pap smear showing LSIL 7/15/19 and a colposcopy showing HSIL, CIN2 at 15 o clock position, and LEEP was recommended  Surgical risks: The patient was informed of all the risks and benefits of a loop electrosurgical excision procedure  Risks included but were not limited to bleeding, infection, injury to the vulva, vagina, cervix, uterine perforation, or negative effects on future pregnancy outcomes    The patient expressed understanding the risks involved, all portions were answered, the patient was consented to the procedure  Description of the procedure: The patient was taken to the operating room  IV sedation was administered and found to be adequate  The patient was then positioned on the operating table in dorsolithotomy position with legs supported by stirrups and SCDs bilaterally  All pressure points were padded  Warm blanket was placed to maintain control of core body temperature  The patient was then prepped and draped in usual sterile fashion  A bimanual exam was performed and uterus is found to be retroverted  Operative technique: A timeout was performed to confirm correct patient and correct procedure  Lesions on left vulva anteriorly and right vulva posteriorly were resected and sent for routine pathology  Hemostasis was achieved with minimal electrocautery  A bivalved coated speculum was inserted into the vagina and the cervix was visualized  Iodine solution was used to evaluate the cervix  Cellular changes noted from 12 o'clock to 4 o'clock  The loop electrode was selected and used to excise the cervical sample using the wide loop  [place] through electrocautery, patient began to move, and procedure was paused at that point  Procedure was recommenced when anesthesia improved  Gross specimen removed in two parts and sent to pathology  Ball electrocautery was used to obtain adequate hemostasis and cauterize margins  Monsel's was then applied to maintain hemostasis  Good hemostasis was confirmed  The bivalve speculum was removed from the vagina  At the completion of the procedure all needle, sponge, instrument counts were noted to be correct ×2  Dr Alexa Cook was present for all key portions of the procedure      Patient Disposition:  PACU     SIGNATURE: Jules Caracmo MD  DATE: September 27, 2019  TIME: 5:33 PM

## 2019-09-27 NOTE — ANESTHESIA POSTPROCEDURE EVALUATION
Post-Op Assessment Note    CV Status:  Stable  Pain Score: 0    Pain management: adequate     Mental Status:  Alert and awake   Hydration Status:  Euvolemic   PONV Controlled:  Controlled   Airway Patency:  Patent   Post Op Vitals Reviewed: Yes      Staff: CRNA           BP (P) 113/69 (09/27/19 1743)    Temp (P) 98 5 °F (36 9 °C) (09/27/19 1743)    Pulse (P) 94 (09/27/19 1743)   Resp (P) 14 (09/27/19 1743)    SpO2 (P) 98 % (09/27/19 1743)

## 2019-09-27 NOTE — INTERVAL H&P NOTE
H&P reviewed  After examining the patient I find no changes in the patients condition since the H&P had been written  Despite being counseled at her last visit regarding preoperative instructions the patient drank water on the way to the Hospital and her surgery will be delayed till 430  She is now asking 2 vulvar lesions be removed same time since she is here  I agreed to do this  She is aware that there is a small risk of infection as well as postoperative pain longer than usual   Depending on what the lesion is there is a risk of recurrence      Vitals:    09/27/19 1515   BP: 118/94   Pulse: 79   Resp: 18   Temp: 97 9 °F (36 6 °C)   SpO2: 100%

## 2019-10-14 ENCOUNTER — OFFICE VISIT (OUTPATIENT)
Dept: OBGYN CLINIC | Facility: CLINIC | Age: 24
End: 2019-10-14

## 2019-10-14 VITALS
TEMPERATURE: 98.4 F | SYSTOLIC BLOOD PRESSURE: 114 MMHG | BODY MASS INDEX: 29.45 KG/M2 | HEIGHT: 60 IN | WEIGHT: 150 LBS | HEART RATE: 87 BPM | DIASTOLIC BLOOD PRESSURE: 81 MMHG

## 2019-10-14 DIAGNOSIS — Z98.890 STATUS POST LEEP (LOOP ELECTROSURGICAL EXCISION PROCEDURE) OF CERVIX: Primary | ICD-10-CM

## 2019-10-14 PROCEDURE — 99213 OFFICE O/P EST LOW 20 MIN: CPT | Performed by: OBSTETRICS & GYNECOLOGY

## 2019-10-14 NOTE — PROGRESS NOTES
Assessment/Plan:      Diagnoses and all orders for this visit:    Status post LEEP (loop electrosurgical excision procedure) of cervix          Subjective:     Patient ID: Kathryn Montgomery is a 21 y o  female  She comes in for postoperative evaluation after LEEP on 9/26  Her only complaint is spotting  Denies pain, fevers, odor, heavy bleeding, urinary complaints, or vaginal discharge  She is amenorrheic with Depo  Patient had questions about resuming intercourse, advised to wait an additional 2 weeks  Reviewed pathology of low grade squamouns intraepithelial lesion with clear margins  Informed of need for Pap with cotesting in 1 year  Review of Systems   Constitutional: Negative  HENT: Negative  Eyes: Negative  Respiratory: Negative  Cardiovascular: Negative  Gastrointestinal: Negative  Endocrine: Negative  Genitourinary: Positive for vaginal bleeding (spotting)  Musculoskeletal: Negative  Neurological: Negative  Psychiatric/Behavioral: Negative  Objective:     Physical Exam   Constitutional: She is oriented to person, place, and time  She appears well-developed and well-nourished  HENT:   Head: Normocephalic and atraumatic  Eyes: Pupils are equal, round, and reactive to light  Neck: Normal range of motion  Pulmonary/Chest: Effort normal    Abdominal: Soft  Genitourinary:       Neurological: She is alert and oriented to person, place, and time  No cranial nerve deficit  Skin: Skin is warm and dry  Psychiatric: She has a normal mood and affect

## 2019-10-22 ENCOUNTER — CLINICAL SUPPORT (OUTPATIENT)
Dept: OBGYN CLINIC | Facility: CLINIC | Age: 24
End: 2019-10-22

## 2019-10-22 DIAGNOSIS — Z30.013 ENCOUNTER FOR INITIAL PRESCRIPTION OF INJECTABLE CONTRACEPTIVE: ICD-10-CM

## 2019-10-22 DIAGNOSIS — Z30.42 ENCOUNTER FOR DEPO-PROVERA CONTRACEPTION: Primary | ICD-10-CM

## 2019-10-22 LAB — SL AMB POCT URINE HCG: NEGATIVE

## 2019-10-22 PROCEDURE — 81025 URINE PREGNANCY TEST: CPT

## 2019-10-22 PROCEDURE — 96372 THER/PROPH/DIAG INJ SC/IM: CPT

## 2019-10-22 RX ORDER — MEDROXYPROGESTERONE ACETATE 150 MG/ML
INJECTION, SUSPENSION INTRAMUSCULAR
Qty: 1 ML | Refills: 0 | Status: SHIPPED | OUTPATIENT
Start: 2019-10-22 | End: 2020-02-12

## 2019-10-22 RX ADMIN — MEDROXYPROGESTERONE ACETATE 150 MG: 150 INJECTION, SUSPENSION INTRAMUSCULAR at 14:31

## 2019-10-22 NOTE — PROGRESS NOTES
Depo-Provera      [x]   Patient provided box    o 4 Refills remain   Last  Annual Date: 07/22/2019  [place] [place] Last Depo date: 07/02/2019   Side effects: no   HCG: yes  o if applicable: negative   Given by: Janny Clifton RN      Site: Right deltoid      Calcium supplement daily teaching, condoms for 2 weeks following first injection dose

## 2020-01-22 ENCOUNTER — APPOINTMENT (OUTPATIENT)
Dept: LAB | Facility: HOSPITAL | Age: 25
End: 2020-01-22
Payer: COMMERCIAL

## 2020-01-22 ENCOUNTER — TRANSCRIBE ORDERS (OUTPATIENT)
Dept: LAB | Facility: HOSPITAL | Age: 25
End: 2020-01-22

## 2020-01-22 ENCOUNTER — OFFICE VISIT (OUTPATIENT)
Dept: OBGYN CLINIC | Facility: CLINIC | Age: 25
End: 2020-01-22

## 2020-01-22 VITALS
HEART RATE: 76 BPM | HEIGHT: 60 IN | WEIGHT: 160 LBS | DIASTOLIC BLOOD PRESSURE: 83 MMHG | BODY MASS INDEX: 31.41 KG/M2 | SYSTOLIC BLOOD PRESSURE: 126 MMHG

## 2020-01-22 DIAGNOSIS — N87.9 CERVICAL DYSPLASIA: ICD-10-CM

## 2020-01-22 DIAGNOSIS — N93.9 ABNORMAL UTERINE BLEEDING (AUB): ICD-10-CM

## 2020-01-22 DIAGNOSIS — Z72.51 HIGH RISK SEXUAL BEHAVIOR, UNSPECIFIED TYPE: ICD-10-CM

## 2020-01-22 DIAGNOSIS — Z11.3 SCREEN FOR STD (SEXUALLY TRANSMITTED DISEASE): Primary | ICD-10-CM

## 2020-01-22 LAB
ERYTHROCYTE [DISTWIDTH] IN BLOOD BY AUTOMATED COUNT: 12.7 % (ref 11.6–15.1)
HCG SERPL QL: NEGATIVE
HCT VFR BLD AUTO: 43.4 % (ref 34.8–46.1)
HGB BLD-MCNC: 14 G/DL (ref 11.5–15.4)
MCH RBC QN AUTO: 29.4 PG (ref 26.8–34.3)
MCHC RBC AUTO-ENTMCNC: 32.3 G/DL (ref 31.4–37.4)
MCV RBC AUTO: 91 FL (ref 82–98)
PLATELET # BLD AUTO: 280 THOUSANDS/UL (ref 149–390)
PMV BLD AUTO: 10.7 FL (ref 8.9–12.7)
RBC # BLD AUTO: 4.76 MILLION/UL (ref 3.81–5.12)
SL AMB POCT URINE HCG: NEGATIVE
TSH SERPL DL<=0.05 MIU/L-ACNC: 1.68 UIU/ML (ref 0.36–3.74)
WBC # BLD AUTO: 7.53 THOUSAND/UL (ref 4.31–10.16)

## 2020-01-22 PROCEDURE — 87389 HIV-1 AG W/HIV-1&-2 AB AG IA: CPT

## 2020-01-22 PROCEDURE — 87340 HEPATITIS B SURFACE AG IA: CPT

## 2020-01-22 PROCEDURE — 86592 SYPHILIS TEST NON-TREP QUAL: CPT

## 2020-01-22 PROCEDURE — 87491 CHLMYD TRACH DNA AMP PROBE: CPT | Performed by: STUDENT IN AN ORGANIZED HEALTH CARE EDUCATION/TRAINING PROGRAM

## 2020-01-22 PROCEDURE — 99213 OFFICE O/P EST LOW 20 MIN: CPT | Performed by: OBSTETRICS & GYNECOLOGY

## 2020-01-22 PROCEDURE — 81025 URINE PREGNANCY TEST: CPT | Performed by: OBSTETRICS & GYNECOLOGY

## 2020-01-22 PROCEDURE — 84703 CHORIONIC GONADOTROPIN ASSAY: CPT

## 2020-01-22 PROCEDURE — 84443 ASSAY THYROID STIM HORMONE: CPT

## 2020-01-22 PROCEDURE — 36415 COLL VENOUS BLD VENIPUNCTURE: CPT

## 2020-01-22 PROCEDURE — 85027 COMPLETE CBC AUTOMATED: CPT

## 2020-01-22 PROCEDURE — 87591 N.GONORRHOEAE DNA AMP PROB: CPT | Performed by: STUDENT IN AN ORGANIZED HEALTH CARE EDUCATION/TRAINING PROGRAM

## 2020-01-22 RX ORDER — ACETAMINOPHEN 325 MG/1
1500 TABLET ORAL EVERY 6 HOURS PRN
COMMUNITY

## 2020-01-23 LAB
HIV 1+2 AB+HIV1 P24 AG SERPL QL IA: NORMAL
RPR SER QL: NORMAL

## 2020-01-23 NOTE — PROGRESS NOTES
Subjective     Liliana Bosestephanie Simon is a 21 y o  female here for AUB after Depo  Her last Depo shot was in 2019  Since November she has had bleeding of a variable flow every single day  She states that someday she needs a pad and some states she only sees blood when she wipes  It is interfering with her life and she is unable to enjoy her normal sex life  She has 1 male partner at this time she has been with her last 6 months  She uses condoms within intermittently  She is interested in changing her contraceptive method   She is unsure of her LMP due to heart irregular bleeding pattern  Gynecologic History  No LMP recorded (lmp unknown)  Contraception: Depo-Provera injections  Last Pap:  2019  Results were: abnormal; status post colpo and LEEP  Last mammogram: N/A  Results were: N/A    Obstetric History  OB History    Para Term  AB Living   2 1 1   1 1   SAB TAB Ectopic Multiple Live Births           1      # Outcome Date GA Lbr Robert/2nd Weight Sex Delivery Anes PTL Lv   2 AB            1 Term                  Review of Systems  Pertinent items are noted in HPI  Objective     /83   Pulse 76   Ht 5' (1 524 m)   Wt 72 6 kg (160 lb)   LMP  (LMP Unknown)   BMI 31 25 kg/m²   General appearance: alert and oriented, in no acute distress  Abdomen: soft, non-tender; bowel sounds normal; no masses,  no organomegaly  Pelvic: external genitalia normal, vagina normal without discharge, uterus normal size, shape, and consistency, no cervical motion tenderness, cervix normal in appearance, no adnexal masses or tenderness and Small amount of dark blood in the vaginal vault, no active bleeding from cervical os      Assessment     Healthy female exam       Plan     Education reviewed: safe sex/STD prevention  Contraception: IUD  Follow up in: 1 week     for IUD placement  TSH, RPR, hepatitis-B, HIV, pelvic ultrasound      Contraceptive options were reviewed, including hormonal methods, both combination (pill, patch, vaginal ring) and progesterone-only (pill, Depo Provera and Nexplanon), intrauterine devices (Mirena, Breanne and Paraguard), barrier methods (condoms, diaphragm) and male/female sterilization  The mechanisms, risks, benefits and side effects of all methods were discussed  All questions have been answered to her satisfaction  D/w Dr Syd Villagomez

## 2020-01-24 ENCOUNTER — HOSPITAL ENCOUNTER (OUTPATIENT)
Dept: RADIOLOGY | Age: 25
Discharge: HOME | End: 2020-01-24
Payer: COMMERCIAL

## 2020-01-24 DIAGNOSIS — N93.9 ABNORMAL UTERINE BLEEDING (AUB): ICD-10-CM

## 2020-01-24 LAB
C TRACH DNA SPEC QL NAA+PROBE: NEGATIVE
HBV SURFACE AG SER QL: NORMAL
N GONORRHOEA DNA SPEC QL NAA+PROBE: NEGATIVE

## 2020-01-24 PROCEDURE — 76830 TRANSVAGINAL US NON-OB: CPT

## 2020-01-24 PROCEDURE — 76856 US EXAM PELVIC COMPLETE: CPT

## 2020-02-12 ENCOUNTER — TELEPHONE (OUTPATIENT)
Dept: INTERNAL MEDICINE CLINIC | Facility: CLINIC | Age: 25
End: 2020-02-12

## 2020-02-12 ENCOUNTER — OFFICE VISIT (OUTPATIENT)
Dept: INTERNAL MEDICINE CLINIC | Facility: CLINIC | Age: 25
End: 2020-02-12

## 2020-02-12 VITALS
OXYGEN SATURATION: 99 % | BODY MASS INDEX: 30.63 KG/M2 | SYSTOLIC BLOOD PRESSURE: 106 MMHG | HEIGHT: 61 IN | TEMPERATURE: 97.8 F | HEART RATE: 69 BPM | WEIGHT: 162.26 LBS | DIASTOLIC BLOOD PRESSURE: 68 MMHG

## 2020-02-12 DIAGNOSIS — Z23 NEED FOR INFLUENZA VACCINATION: ICD-10-CM

## 2020-02-12 DIAGNOSIS — Z11.1 SCREENING-PULMONARY TB: ICD-10-CM

## 2020-02-12 DIAGNOSIS — E66.9 OBESITY (BMI 30.0-34.9): ICD-10-CM

## 2020-02-12 DIAGNOSIS — Z00.00 ROUTINE ADULT HEALTH MAINTENANCE: Primary | ICD-10-CM

## 2020-02-12 DIAGNOSIS — H61.23 BILATERAL IMPACTED CERUMEN: ICD-10-CM

## 2020-02-12 PROCEDURE — 86580 TB INTRADERMAL TEST: CPT | Performed by: PHYSICIAN ASSISTANT

## 2020-02-12 PROCEDURE — 99395 PREV VISIT EST AGE 18-39: CPT | Performed by: PHYSICIAN ASSISTANT

## 2020-02-12 PROCEDURE — 90686 IIV4 VACC NO PRSV 0.5 ML IM: CPT | Performed by: PHYSICIAN ASSISTANT

## 2020-02-12 PROCEDURE — 3008F BODY MASS INDEX DOCD: CPT | Performed by: PHYSICIAN ASSISTANT

## 2020-02-12 PROCEDURE — 90471 IMMUNIZATION ADMIN: CPT | Performed by: PHYSICIAN ASSISTANT

## 2020-02-12 NOTE — PROGRESS NOTES
Assessment/Plan:      Diagnoses and all orders for this visit:    Routine adult health maintenance    Obesity (BMI 30 0-34  9)    Bilateral impacted cerumen    Screening-pulmonary TB  -     TB Skin Test    Need for influenza vaccination  -     influenza vaccine, [id number], quadrivalent, 0 5 mL, preservative-free, for adult and pediatric patients 6 mos+ (AFLURIA, FLUARIX, FLULAVAL, FLUZONE)      patient is a 27-year-old female presenting today for health maintenance visit for employment and 2 step PP D  Tdap up-to-date from 2017  Flu vaccine updated today  First ppd administered today and will return in 48-72 hours for read, repeat PPD in 1 week  Form completed on my ended return back to medical assistant  Age-appropriate education regarding screenings and prevention were addressed with patient today  BMI mainly addressed as she would like to know what to do regarding dietary modification and for weight loss  BMI currently 30 66  She has not been doing anything to lose weight at this time  Discussed importance of low-fat/low-cholesterol diet avoiding fried and fatty foods as well as fast foods  Discussed small portions, calorie counting and incorporating high protein, fruits and vegetables with her diet  Also advised avoiding sugary drinks and considering diet is main source of fluid intake  She expresses understanding  Also discussed importance of incorporating exercise and recommended high-intensity training HIT exercises that she can find on [employer], [employer] or [employer] about 20 minutes 3 to 4 times a week which burns the highest amount of calories  Also addressed with patient recommendations for routine screenings which mainly includes Pap testing  She is already involved with gyn for this as she had an abnormal Pap with recently procedure which condyloma was seen on left and right vulvar biopsies and LEEP excision of cervix showed low-grade squamous intraepithelial lesion     She remains in contact with gyn and has appointment later this week for placement of the Mirena IUD  Advised yearly eye exams which she is overdue and will schedule an appointment  Also dental cleanings q 6 months advise  Once again she will return as needed, I offered weight check in 4-6 months but patient declines  Advised she set a goal for herself 10 lb weight loss in 3 months and advised she keep track of her weights at home with goals written on a calendar  Would recommend yearly physicals  Will return as planned for nurse visit for PPD  Chief Complaint   Patient presents with    Follow-up    Physical Exam     PPD for work ,paper work in red folder         Subjective:     Patient ID: Cathy Duvall is a 21 y o  female     25y/o female here today annual physical and pre-employment physical for  position, also requiring 2 step PPD  Denies any changes to health aside from LEEP procedure from GYN secondary to abnormal Pap  Doing OK post procedure, also had pelvic U/S  Had 3 months of vaginal bleeding but that has since stopped  Off depo, getting mirena at end of week  Last eye exam 2015 - wears glasses for distance, glasses broke  Last dental cleaning 2019  Brushes teeth twice a day  She states she eats poorly, states she eats anything, large portions, does not follow specific diet  She does eat out a lot/FF as well  Also a lot of fatty foods, high sugar/carbs  She drinks mainly water, sugar free power aids  Rare caffeine/coffee  He does not take vitamins  She does not formally exercise  LMP: unknown because had stopped depo and had 3 months of vaginal bleeding  Sexually active with current male partner x 6 months  Uses condoms inconsistently  Will be getting mirena this week - f/u with GYN  Lives in a house with father, feels safe at home  Working smoke alarms  She drives, wears seatbelt  No recent travel outside country, born in [address]   Denies any infectious sxs, not at risk for communicable disease  No hx positive PPD  Denies current tobacco or drug use, states quit marijuana and tobacco x 3 months and feels well  ETOH social, about 3-4 drinks/week  Review of Systems   Constitutional: Positive for unexpected weight change (weight gain, poor diet/lack of exercise)  HENT: Negative  Eyes:        Wears glasses   Respiratory: Negative  Cardiovascular: Negative  Gastrointestinal:        Occasional constipation/hard stool   Endocrine: Negative  Genitourinary: Negative  Musculoskeletal: Negative  Skin: Negative  Neurological: Negative  Hematological: Negative  Psychiatric/Behavioral: Negative  The following portions of the patient's history were reviewed and updated as appropriate: allergies, current medications, past family history, past medical history, past social history, past surgical history and problem list       Objective:     Physical Exam   Constitutional: She is oriented to person, place, and time  She appears well-developed  No distress  Obesity BMI 30 66   HENT:   Head: Normocephalic  Nose: Nose normal    Mouth/Throat: Oropharynx is clear and moist    B/L cerumen impaction - TM's not visible B/L   Eyes: Pupils are equal, round, and reactive to light  Conjunctivae, EOM and lids are normal    Neck: Phonation normal  Neck supple  Normal carotid pulses present  Carotid bruit is not present  No thyroid mass present  Cardiovascular: Normal rate, regular rhythm, normal heart sounds and normal pulses  No extremity edema   Pulmonary/Chest: Effort normal and breath sounds normal    Abdominal: Soft  Bowel sounds are normal  There is no tenderness  Mild abdominal obesity   Musculoskeletal:   Gross normal appearance of spine and extremities without obvious deformity      Strength and movement intact of neck, spine and extremities without pain or limitation   Lymphadenopathy:        Head (right side): No submandibular and no tonsillar adenopathy present  Head (left side): No submandibular and no tonsillar adenopathy present  Neurological: She is alert and oriented to person, place, and time  She exhibits normal muscle tone  Coordination and gait normal    Reflex Scores:       Brachioradialis reflexes are 2+ on the right side and 2+ on the left side  Patellar reflexes are 2+ on the right side and 2+ on the left side  Skin: Skin is intact  No rash noted  Psychiatric: She has a normal mood and affect  Her speech is normal and behavior is normal  Thought content normal    Vitals reviewed  Vitals:    02/12/20 1036   BP: 106/68   BP Location: Right arm   Patient Position: Sitting   Cuff Size: Adult   Pulse: 69   Temp: 97 8 °F (36 6 °C)   TempSrc: Oral   SpO2: 99%   Weight: 73 6 kg (162 lb 4 1 oz)   Height: 5' 1" (1 549 m)       BMI Counseling: Body mass index is 30 66 kg/m²  The BMI is above normal  Nutrition recommendations include reducing portion sizes, decreasing overall calorie intake, 3-5 servings of fruits/vegetables daily, reducing fast food intake, consuming healthier snacks, moderation in carbohydrate intake, increasing intake of lean protein, reducing intake of saturated fat and trans fat and reducing intake of cholesterol  Exercise recommendations include moderate aerobic physical activity for 150 minutes/week

## 2020-02-12 NOTE — PATIENT INSTRUCTIONS
Low Fat Diet   AMBULATORY CARE:   A low-fat diet  is an eating plan that is low in total fat, unhealthy fat, and cholesterol  You may need to follow a low-fat diet if you have trouble digesting or absorbing fat  You may also need to follow this diet if you have high cholesterol  You can also lower your cholesterol by increasing the amount of fiber in your diet  Soluble fiber is a type of fiber that helps to decrease cholesterol levels  Different types of fat in food:   · Limit unhealthy fats  A diet that is high in cholesterol, saturated fat, and trans fat may cause unhealthy cholesterol levels  Unhealthy cholesterol levels increase your risk of heart disease  ¨ Cholesterol:  Limit intake of cholesterol to less than 200 mg per day  Cholesterol is found in meat, eggs, and dairy  ¨ Saturated fat:  Limit saturated fat to less than 7% of your total daily calories  Ask your dietitian how many calories you need each day  Saturated fat is found in butter, cheese, ice cream, whole milk, and palm oil  Saturated fat is also found in meat, such as beef, pork, chicken skin, and processed meats  Processed meats include sausage, hot dogs, and bologna  ¨ Trans fat:  Avoid trans fat as much as possible  Trans fat is used in fried and baked foods  Foods that say trans fat free on the label may still have up to 0 5 grams of trans fat per serving  · Include healthy fats  Replace foods that are high in saturated and trans fat with foods high in healthy fats  This may help to decrease high cholesterol levels  ¨ Monounsaturated fats: These are found in avocados, nuts, and vegetable oils, such as olive, canola, and sunflower oil  ¨ Polyunsaturated fats: These can be found in vegetable oils, such as soybean or corn oil  Omega-3 fats can help to decrease the risk of heart disease  Omega-3 fats are found in fish, such as salmon, herring, trout, and tuna   Omega-3 fats can also be found in plant foods, such as walnuts, flaxseed, soybeans, and canola oil    Foods to limit or avoid:   · Grains:      ¨ Snacks that are made with partially hydrogenated oils, such as chips, regular crackers, and butter-flavored popcorn    ¨ High-fat baked goods, such as biscuits, croissants, doughnuts, pies, cookies, and pastries    · Dairy:      ¨ Whole milk, 2% milk, and yogurt and ice cream made with whole milk    ¨ Half and half creamer, heavy cream, and whipping cream    ¨ Cheese, cream cheese, and sour cream    · Meats and proteins:      ¨ High-fat cuts of meat (T-bone steak, regular hamburger, and ribs)    ¨ Fried meat, poultry (turkey and chicken), and fish    ¨ Poultry (chicken and turkey) with skin    ¨ Cold cuts (salami or bologna), hot dogs, isidro, and sausage    ¨ Whole eggs and egg yolks    · Vegetables and fruits with added fat:      ¨ Fried vegetables or vegetables in butter or high-fat sauces, such as cream or cheese sauces    ¨ Fried fruit or fruit served with butter or cream    · Fats:      ¨ Butter, stick margarine, and shortening    ¨ Coconut, palm oil, and palm kernel oil  Foods to include:   · Grains:      ¨ Whole-grain breads, cereals, pasta, and brown rice    ¨ Low-fat crackers and pretzels    · Vegetables and fruits:      ¨ Fresh, frozen, or canned vegetables (no salt or low-sodium)    ¨ Fresh, frozen, dried, or canned fruit (canned in light syrup or fruit juice)    ¨ Avocado    · Low-fat dairy products:      ¨ Nonfat (skim) or 1% milk    ¨ Nonfat or low-fat cheese, yogurt, and cottage cheese    · Meats and proteins:      ¨ Chicken or turkey with no skin    ¨ Baked or broiled fish    ¨ Lean beef and pork (loin, round, extra lean hamburger)    ¨ Beans and peas, unsalted nuts, soy products    ¨ Egg whites and substitutes    ¨ Seeds and nuts    · Fats:      ¨ Unsaturated oil, such as canola, olive, peanut, soybean, or sunflower oil    ¨ Soft or liquid margarine and vegetable oil spread    ¨ Low-fat salad dressing  Other ways to decrease fat:   · Read food labels before you buy foods  Choose foods that have less than 30% of calories from fat  Choose low-fat or fat-free dairy products  Remember that fat free does not mean calorie free  These foods still contain calories, and too many calories can lead to weight gain  · Trim fat from meat and avoid fried food  Trim all visible fat from meat before you cook it  Remove the skin from poultry  Do not longoria meat, fish, or poultry  Bake, roast, boil, or broil these foods instead  Avoid fried foods  Eat a baked potato instead of [place] fries  Steam vegetables instead of sautéing them in butter  · Add less fat to foods  Use imitation isidro bits on salads and baked potatoes instead of regular isidro bits  Use fat-free or low-fat salad dressings instead of regular dressings  Use low-fat or nonfat butter-flavored topping instead of regular butter or margarine on popcorn and other foods  Ways to decrease fat in recipes:  Replace high-fat ingredients with low-fat or nonfat ones  This may cause baked goods to be drier than usual  You may need to use nonfat cooking spray on pans to prevent food from sticking  You also may need to change the amount of other ingredients, such as water, in the recipe  Try the following:  · Use low-fat or light margarine instead of regular margarine or shortening  · Use lean ground turkey breast or chicken, or lean ground beef (less than 5% fat) instead of hamburger  · Add 1 teaspoon of canola oil to 8 ounces of skim milk instead of using cream or half and half  · Use grated zucchini, carrots, or apples in breads instead of coconut  · Use blenderized, low-fat cottage cheese, plain tofu, or low-fat ricotta cheese instead of cream cheese  · Use 1 egg white and 1 teaspoon of canola oil, or use ¼ cup (2 ounces) of fat-free egg substitute instead of a whole egg       · Replace half of the oil that is called for in a recipe with applesauce when you bake  Use 3 tablespoons of cocoa powder and 1 tablespoon of canola oil instead of a square of baking chocolate  How to increase fiber:  Eat enough high-fiber foods to get 20 to 30 grams of fiber every day  Slowly increase your fiber intake to avoid stomach cramps, gas, and other problems  · Eat 3 ounces of whole-grain foods each day  An ounce is about 1 slice of bread  Eat whole-grain breads, such as whole-wheat bread  Whole wheat, whole-wheat flour, or other whole grains should be listed as the first ingredient on the food label  Replace white flour with whole-grain flour or use half of each in recipes  Whole-grain flour is heavier than white flour, so you may have to add more yeast or baking powder  · Eat a high-fiber cereal for breakfast   Oatmeal is a good source of soluble fiber  Look for cereals that have bran or fiber in the name  Choose whole-grain products, such as brown rice, barley, and whole-wheat pasta  · Eat more beans, peas, and lentils  For example, add beans to soups or salads  Eat at least 5 cups of fruits and vegetables each day  Eat fruits and vegetables with the peel because the peel is high in fiber  © 2017 [employer] Information is for End User's use only and may not be sold, redistributed or otherwise used for commercial purposes  All illustrations and images included in CareNotes® are the copyrighted property of [employer]  or [employer]  The above information is an  only  It is not intended as medical advice for individual conditions or treatments  Talk to your doctor, nurse or pharmacist before following any medical regimen to see if it is safe and effective for you

## 2020-02-12 NOTE — TELEPHONE ENCOUNTER
Folder Color- Red     Name of Form- [employer] Employee Health Statement     Form to be filled out by- Radha Adams    Form to be Faxed (patient will [provide when available)    Patient made aware of 10 business day policy   Yes

## 2020-02-12 NOTE — TELEPHONE ENCOUNTER
Patient is here today for PPD placement  PPD placed in right forearm  Patient does, have paperwork to be completed  Form can be found in the RED bin  TB test was placed on 02/11/2020 AM     Patient is aware to return for TB test reading on 02/14/2020 after 11:30 AM      Patient was provided a TB appointment reminder with this information

## 2020-02-14 LAB
INDURATION: 0 MM
TB SKIN TEST: NEGATIVE

## 2020-02-14 NOTE — TELEPHONE ENCOUNTER
PPD read on 2/24/2020 as negative, 0mm - form filled out and placed back in red folder until 2nd step completed

## 2020-02-19 ENCOUNTER — TELEPHONE (OUTPATIENT)
Dept: INTERNAL MEDICINE CLINIC | Facility: CLINIC | Age: 25
End: 2020-02-19

## 2020-02-19 ENCOUNTER — CLINICAL SUPPORT (OUTPATIENT)
Dept: INTERNAL MEDICINE CLINIC | Facility: CLINIC | Age: 25
End: 2020-02-19

## 2020-02-19 DIAGNOSIS — Z11.1 SCREENING FOR TUBERCULOSIS: Primary | ICD-10-CM

## 2020-02-19 PROCEDURE — 86580 TB INTRADERMAL TEST: CPT | Performed by: INTERNAL MEDICINE

## 2020-02-19 NOTE — TELEPHONE ENCOUNTER
Patient is here today for PPD placement  PPD placed in left forearm  Patient does have paperwork to be completed  Form can be found in the red bin  TB test was placed on 2/19/20 at 10:00am     Patient is aware to return for TB test reading on 2/21/20 before 10:00am     Patient was provided a TB appointment reminder with this information

## 2020-02-19 NOTE — PROGRESS NOTES
Patient here in office for 2nd step PPD  Administered PPD in left lower forearm  Patient tolerated well

## 2020-02-21 LAB
INDURATION: 0 MM
TB SKIN TEST: NEGATIVE

## 2020-05-29 ENCOUNTER — APPOINTMENT (OUTPATIENT)
Dept: LAB | Facility: HOSPITAL | Age: 25
End: 2020-05-29
Payer: COMMERCIAL

## 2020-05-29 ENCOUNTER — TRANSCRIBE ORDERS (OUTPATIENT)
Dept: LAB | Facility: HOSPITAL | Age: 25
End: 2020-05-29

## 2020-05-29 DIAGNOSIS — Z98.890 PERSONAL HISTORY OF SURGERY TO HEART AND GREAT VESSELS, PRESENTING HAZARDS TO HEALTH: ICD-10-CM

## 2020-05-29 DIAGNOSIS — R11.0 NAUSEA: ICD-10-CM

## 2020-05-29 DIAGNOSIS — Z86.69 PERSONAL HISTORY OF OTHER DISORDERS OF NERVOUS SYSTEM AND SENSE ORGANS: ICD-10-CM

## 2020-05-29 DIAGNOSIS — Z34.91 FIRST TRIMESTER PREGNANCY: Primary | ICD-10-CM

## 2020-05-29 DIAGNOSIS — R12 HEARTBURN: ICD-10-CM

## 2020-05-29 DIAGNOSIS — N89.8 VAGINAL DISCHARGE IN PREGNANCY, FIRST TRIMESTER: ICD-10-CM

## 2020-05-29 DIAGNOSIS — O26.891 VAGINAL DISCHARGE IN PREGNANCY, FIRST TRIMESTER: ICD-10-CM

## 2020-05-29 DIAGNOSIS — Z98.890 HX LEEP (LOOP ELECTROSURGICAL EXCISION PROCEDURE), CERVIX, PREGNANCY, FIRST TRIMESTER: ICD-10-CM

## 2020-05-29 DIAGNOSIS — Z34.91 FIRST TRIMESTER PREGNANCY: ICD-10-CM

## 2020-05-29 DIAGNOSIS — Z87.42 PERSONAL HISTORY OF REPRODUCTIVE AND OBSTETRICAL PROBLEMS: ICD-10-CM

## 2020-05-29 DIAGNOSIS — Z36.9 UNSPECIFIED ANTENATAL SCREENING: ICD-10-CM

## 2020-05-29 DIAGNOSIS — N92.6 IRREGULAR MENSTRUAL CYCLE: ICD-10-CM

## 2020-05-29 DIAGNOSIS — O34.41 HX LEEP (LOOP ELECTROSURGICAL EXCISION PROCEDURE), CERVIX, PREGNANCY, FIRST TRIMESTER: ICD-10-CM

## 2020-05-29 DIAGNOSIS — Z98.891 PREVIOUS CESAREAN SECTION: ICD-10-CM

## 2020-05-29 LAB
ABO GROUP BLD: NORMAL
BACTERIA UR QL AUTO: ABNORMAL /HPF
BASOPHILS # BLD AUTO: 0.03 THOUSANDS/ΜL (ref 0–0.1)
BASOPHILS NFR BLD AUTO: 1 % (ref 0–1)
BILIRUB UR QL STRIP: NEGATIVE
BLD GP AB SCN SERPL QL: NEGATIVE
CLARITY UR: ABNORMAL
COLOR UR: YELLOW
EOSINOPHIL # BLD AUTO: 0.09 THOUSAND/ΜL (ref 0–0.61)
EOSINOPHIL NFR BLD AUTO: 1 % (ref 0–6)
ERYTHROCYTE [DISTWIDTH] IN BLOOD BY AUTOMATED COUNT: 12.6 % (ref 11.6–15.1)
GLUCOSE 1H P 50 G GLC PO SERPL-MCNC: 150 MG/DL
GLUCOSE UR STRIP-MCNC: NEGATIVE MG/DL
HBV SURFACE AG SER QL: NORMAL
HCT VFR BLD AUTO: 36.9 % (ref 34.8–46.1)
HGB BLD-MCNC: 12.4 G/DL (ref 11.5–15.4)
HGB UR QL STRIP.AUTO: NEGATIVE
IMM GRANULOCYTES # BLD AUTO: 0.04 THOUSAND/UL (ref 0–0.2)
IMM GRANULOCYTES NFR BLD AUTO: 1 % (ref 0–2)
KETONES UR STRIP-MCNC: NEGATIVE MG/DL
LEUKOCYTE ESTERASE UR QL STRIP: ABNORMAL
LYMPHOCYTES # BLD AUTO: 1.72 THOUSANDS/ΜL (ref 0.6–4.47)
LYMPHOCYTES NFR BLD AUTO: 26 % (ref 14–44)
MCH RBC QN AUTO: 29.4 PG (ref 26.8–34.3)
MCHC RBC AUTO-ENTMCNC: 33.6 G/DL (ref 31.4–37.4)
MCV RBC AUTO: 87 FL (ref 82–98)
MONOCYTES # BLD AUTO: 0.42 THOUSAND/ΜL (ref 0.17–1.22)
MONOCYTES NFR BLD AUTO: 6 % (ref 4–12)
NEUTROPHILS # BLD AUTO: 4.27 THOUSANDS/ΜL (ref 1.85–7.62)
NEUTS SEG NFR BLD AUTO: 65 % (ref 43–75)
NITRITE UR QL STRIP: NEGATIVE
NON-SQ EPI CELLS URNS QL MICRO: ABNORMAL /HPF
NRBC BLD AUTO-RTO: 0 /100 WBCS
PH UR STRIP.AUTO: 7 [PH]
PLATELET # BLD AUTO: 197 THOUSANDS/UL (ref 149–390)
PMV BLD AUTO: 10.4 FL (ref 8.9–12.7)
PROT UR STRIP-MCNC: NEGATIVE MG/DL
RBC # BLD AUTO: 4.22 MILLION/UL (ref 3.81–5.12)
RBC #/AREA URNS AUTO: ABNORMAL /HPF
RH BLD: POSITIVE
RUBV IGG SERPL IA-ACNC: 56.1 IU/ML
SP GR UR STRIP.AUTO: 1 (ref 1–1.03)
SPECIMEN EXPIRATION DATE: NORMAL
UROBILINOGEN UR QL STRIP.AUTO: 0.2 E.U./DL
WBC # BLD AUTO: 6.57 THOUSAND/UL (ref 4.31–10.16)
WBC #/AREA URNS AUTO: ABNORMAL /HPF

## 2020-05-29 PROCEDURE — 81001 URINALYSIS AUTO W/SCOPE: CPT

## 2020-05-29 PROCEDURE — 36415 COLL VENOUS BLD VENIPUNCTURE: CPT

## 2020-05-29 PROCEDURE — 82950 GLUCOSE TEST: CPT

## 2020-05-29 PROCEDURE — 80081 OBSTETRIC PANEL INC HIV TSTG: CPT

## 2020-05-29 PROCEDURE — 87086 URINE CULTURE/COLONY COUNT: CPT

## 2020-05-30 LAB — BACTERIA UR CULT: NORMAL

## 2020-05-31 LAB — HIV 1+2 AB+HIV1 P24 AG SERPL QL IA: NORMAL

## 2020-06-01 LAB — RPR SER QL: NORMAL

## 2020-10-06 ENCOUNTER — TELEPHONE (OUTPATIENT)
Dept: OBGYN CLINIC | Facility: CLINIC | Age: 25
End: 2020-10-06

## 2021-06-15 NOTE — PROGRESS NOTES
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      PHYSICAL EXAMINATION:  General: well developed and well nourished, alert, oriented times 3 and appears comfortable  Psychiatric: Normal    MUSCULOSKELETAL EXAMINATION:  Incision: Clean, dry, intact  Range of Motion: As expected  Neurovascular status: Neuro intact, good cap refill  Activity Restrictions: No restrictions         STUDIES REVIEWED:  No Studies to review      PROCEDURES PERFORMED:  Procedures  No Procedures performed today Quality 226: Preventive Care And Screening: Tobacco Use: Screening And Cessation Intervention: Patient screened for tobacco use and is an ex/non-smoker Detail Level: Detailed

## (undated) DEVICE — LEEP LOOP ELECTRODE WIDE 20 X 15

## (undated) DEVICE — 3M™ STERI-STRIP™ REINFORCED ADHESIVE SKIN CLOSURES, R1547, 1/2 IN X 4 IN (12 MM X 100 MM), 6 STRIPS/ENVELOPE: Brand: 3M™ STERI-STRIP™

## (undated) DEVICE — GLOVE INDICATOR PI UNDERGLOVE SZ 6.5 BLUE

## (undated) DEVICE — PAD GROUNDING ADULT

## (undated) DEVICE — INTENDED FOR TISSUE SEPARATION, AND OTHER PROCEDURES THAT REQUIRE A SHARP SURGICAL BLADE TO PUNCTURE OR CUT.: Brand: BARD-PARKER SAFETY BLADES SIZE 15, STERILE

## (undated) DEVICE — MEDI-VAC TUBING CONNECTOR 6-IN-1 STRAIGHT: Brand: CARDINAL HEALTH

## (undated) DEVICE — GLOVE SRG BIOGEL 8

## (undated) DEVICE — GLOVE INDICATOR PI UNDERGLOVE SZ 8 BLUE

## (undated) DEVICE — PACK PLASTIC HAND PBDS

## (undated) DEVICE — GLOVE SRG BIOGEL 7.5

## (undated) DEVICE — SUT SILK 2-0 SH 30 IN K833H

## (undated) DEVICE — SUT MONOCRYL 4-0 P-3 18 IN Y494G

## (undated) DEVICE — GLOVE PI ULTRA TOUCH SZ.7.0

## (undated) DEVICE — CHLORAPREP HI-LITE 26ML ORANGE

## (undated) DEVICE — SMOKE EVACUATION TUBING WITH 8 IN INTEGRAL WAND AND SPONGE GUARD: Brand: BUFFALO FILTER

## (undated) DEVICE — GLOVE INDICATOR PI UNDERGLOVE SZ 8.5 BLUE

## (undated) DEVICE — ADHESIVE SKN CLSR HISTOACRYL FLEX 0.5ML LF

## (undated) DEVICE — GLOVE INDICATOR PI UNDERGLOVE SZ 7.5 BLUE

## (undated) DEVICE — STERILE 8 INCH PROCTO SWAB: Brand: CARDINAL HEALTH

## (undated) DEVICE — Device

## (undated) DEVICE — SPONGE PVP SCRUB WING STERILE

## (undated) DEVICE — 3000CC GUARDIAN II: Brand: GUARDIAN

## (undated) DEVICE — OCCLUSIVE GAUZE STRIP,3% BISMUTH TRIBROMOPHENATE IN PETROLATUM BLEND: Brand: XEROFORM

## (undated) DEVICE — GLOVE SRG BIOGEL 6.5

## (undated) DEVICE — ELECTRODE BALL E-Z CLEAN 2IN -0015

## (undated) DEVICE — GAUZE SPONGES,16 PLY: Brand: CURITY